# Patient Record
Sex: MALE | Race: WHITE | NOT HISPANIC OR LATINO | Employment: FULL TIME | URBAN - METROPOLITAN AREA
[De-identification: names, ages, dates, MRNs, and addresses within clinical notes are randomized per-mention and may not be internally consistent; named-entity substitution may affect disease eponyms.]

---

## 2018-07-18 ENCOUNTER — TELEPHONE (OUTPATIENT)
Dept: FAMILY MEDICINE | Facility: CLINIC | Age: 48
End: 2018-07-18

## 2018-07-18 NOTE — TELEPHONE ENCOUNTER
----- Message from Anna White sent at 7/18/2018  7:56 AM CDT -----    Patient   Dentist  Office , [facility]  [facility]  Called  To  Inform the doctor  Pt  Runny high    Blood  Pressure   High 147/108 142/103 pt  Had  Two  High  Readings //please call patient [phone]  For  Advice  As to  Coming  In   Or any  Other  Advice ,  Pt  Not on  Blood  Pressure  meds

## 2018-07-18 NOTE — TELEPHONE ENCOUNTER
Pt complains of hypertension:  How long have you been experiencing this? years  Do you keep a log?No  What was you last reading? 142/103  Are you taking your medication as directed? No  What time did you take your medication last? n/a  What are your symptoms?n/a, pt declines headache  Blurred vision?No  Ringing in ears?No  Feel flush?No  Dizziness?Yes  Increased heart rate?No  Shortness of breath?Non/a    Dyspneic on excertion?No  Chest pain?Non/a  Nausea/vomiting? No  Confusion? 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

## 2018-07-19 ENCOUNTER — OFFICE VISIT (OUTPATIENT)
Dept: FAMILY MEDICINE | Facility: CLINIC | Age: 48
End: 2018-07-19
Payer: COMMERCIAL

## 2018-07-19 VITALS
BODY MASS INDEX: 29.2 KG/M2 | HEART RATE: 74 BPM | SYSTOLIC BLOOD PRESSURE: 144 MMHG | WEIGHT: 227.5 LBS | TEMPERATURE: 98 F | HEIGHT: 74 IN | DIASTOLIC BLOOD PRESSURE: 98 MMHG

## 2018-07-19 DIAGNOSIS — R94.31 ABNORMAL EKG: ICD-10-CM

## 2018-07-19 DIAGNOSIS — Z78.9 CAFFEINE USE: ICD-10-CM

## 2018-07-19 DIAGNOSIS — I10 UNCONTROLLED HYPERTENSION: Primary | ICD-10-CM

## 2018-07-19 DIAGNOSIS — Z83.3 FAMILY HISTORY OF DIABETES MELLITUS (DM): ICD-10-CM

## 2018-07-19 DIAGNOSIS — E66.3 OVERWEIGHT: ICD-10-CM

## 2018-07-19 DIAGNOSIS — Z82.49 FAMILY HISTORY OF HEART DISEASE: ICD-10-CM

## 2018-07-19 PROCEDURE — 99214 OFFICE O/P EST MOD 30 MIN: CPT | Mod: S$GLB,,, | Performed by: INTERNAL MEDICINE

## 2018-07-19 PROCEDURE — 99999 PR PBB SHADOW E&M-EST. PATIENT-LVL III: CPT | Mod: PBBFAC,,, | Performed by: INTERNAL MEDICINE

## 2018-07-19 PROCEDURE — 93005 ELECTROCARDIOGRAM TRACING: CPT | Mod: S$GLB,,, | Performed by: INTERNAL MEDICINE

## 2018-07-19 PROCEDURE — 3008F BODY MASS INDEX DOCD: CPT | Mod: CPTII,S$GLB,, | Performed by: INTERNAL MEDICINE

## 2018-07-19 PROCEDURE — 93010 ELECTROCARDIOGRAM REPORT: CPT | Mod: ,,, | Performed by: INTERNAL MEDICINE

## 2018-07-19 RX ORDER — AMLODIPINE BESYLATE 5 MG/1
TABLET ORAL
Qty: 30 TABLET | Refills: 2 | Status: SHIPPED | OUTPATIENT
Start: 2018-07-19 | End: 2018-10-06 | Stop reason: SDUPTHER

## 2018-07-19 NOTE — PATIENT INSTRUCTIONS
Low-Salt Diet  This diet removes foods that are high in salt. It also limits the amount of salt you use when cooking. It is most often used for people with high blood pressure, edema (fluid retention), and kidney, liver, or heart disease.  Table salt contains the mineral sodium. Your body needs sodium to work normally. But too much sodium can make your health problems worse. Your healthcare provider is recommending a low-salt (also called low-sodium) diet for you. Your total daily allowance of salt is 1,500 to 2,300 milligrams (mg). It is less than 1 teaspoon of table salt. This means you can have only about 500 to 700 mg of sodium at each meal. People with certain health problems should limit salt intake to the lower end of the recommended range.    When you cook, dont add much salt. If you can cook without using salt, even better. Dont add salt to your food at the table.  When shopping, read food labels. Salt is often called sodium on the label. Choose foods that are salt-free, low salt, or very low salt. Note that foods with reduced salt may not lower your salt intake enough.    Beans, potatoes, and pasta  Ok: Dry beans, split peas, lentils, potatoes, rice, macaroni, pasta, spaghetti without added salt  Avoid: Potato chips, tortilla chips, and similar products  Breads and cereals  Ok: Low-sodium breads, rolls, cereals, and cakes; low-salt crackers, matzo crackers  Avoid: Salted crackers, pretzels, popcorn, Occitan toast, pancakes, muffins  Dairy  Ok: Milk, chocolate milk, hot chocolate mix, low-salt cheeses, and yogurt  Avoid: Processed cheese and cheese spreads; Roquefort, Camembert, and cottage cheese; buttermilk, instant breakfast drink  Desserts  Ok: Ice cream, frozen yogurt, juice bars, gelatin, cookies and pies, sugar, honey, jelly, hard candy  Avoid: Most pies, cakes and cookies prepared or processed with salt; instant pudding  Drinks  Ok: Tea, coffee, fizzy (carbonated) drinks, juices  Avoid: Flavored  coffees, electrolyte replacement drinks, sports drinks  Meats  Ok: All fresh meat, fish, poultry, low-salt tuna, eggs, egg substitute  Avoid: Smoked, pickled, brine-cured, or salted meats and fish. This includes brannon, chipped beef, corned beef, hot dogs, deli meats, ham, kosher meats, salt pork, sausage, canned tuna, salted codfish, smoked salmon, herring, sardines, or anchovies.  Seasonings and spices  Ok: Most seasonings are okay. Good substitutes for salt include: fresh herb blends, hot sauce, lemon, garlic, armendariz, vinegar, dry mustard, parsley, cilantro, horseradish, tomato paste, regular margarine, mayonnaise, unsalted butter, cream cheese, vegetable oil, cream, low-salt salad dressing and gravy.  Avoid: Regular ketchup, relishes, pickles, soy sauce, teriyaki sauce, Worcestershire sauce, BBQ sauce, tartar sauce, meat tenderizer, chili sauce, regular gravy, regular salad dressing, salted butter  Soups  Ok: Low-salt soups and broths made with allowed foods  Avoid: Bouillon cubes, soups with smoked or salted meats, regular soup and broth  Vegetables  Ok: Most vegetables are okay; also low-salt tomato and vegetable juices  Avoid: Sauerkraut and other brine-soaked vegetables; pickles and other pickled vegetables; tomato juice, olives  Date Last Reviewed: 8/1/2016 © 2000-2017 [employer]. [address] [place] [id number]. All rights reserved. This information is not intended as a substitute for professional medical care. Always follow your healthcare professional's instructions.        Eating Heart-Healthy Foods  Eating has a big impact on your heart health. In fact, eating healthier can improve several of your heart risks at once. For instance, it helps you manage weight, cholesterol, and blood pressure. Here are ideas to help you make heart-healthy changes without giving up all the foods and flavors you love.  Getting started  · Talk with your health care provider about eating plans, such  as the DASH or Mediterranean diet. You may also be referred to a dietitian.  · Change a few things at a time. Give yourself time to get used to a few eating changes before adding more.  · Work to create a tasty, healthy eating plan that you can stick to for the rest of your life.    Goals for healthy eating  Below are some tips to improve your eating habits:  · Limit saturated fats and trans fats. Saturated fats raise your levels of cholesterol, so keep these fats to a minimum. They are found in foods such as fatty meats, whole milk, cheese, and palm and coconut oils. Avoid trans fats because they lower good cholesterol as well as raise bad cholesterol. Trans fats are most often found in processed foods.  · Reduce sodium (salt) intake. Eating too much salt may increase your blood pressure. Limit your sodium intake to 2,300 milligrams (mg) per day, or less if your health care provider recommends it. Dining out less often and eating fewer processed foods are two great ways to decrease the amount of salt you consume.  · Managing calories. A calorie is a unit of energy. Your body burns calories for fuel, but if you eat more calories than your body burns, the extras are stored as fat. Your health care provider can help you create a diet plan to manage your calories. This will likely include eating healthier foods as well as exercising regularly. To help you track your progress, keep a diary to record what you eat and how often you exercise.  Choose the right foods  Aim to make these foods staples of your diet. If you have diabetes, you may have different recommendations than what is listed here:  · Fruits and vegetable provide plenty of nutrients without a lot of calories. At meals, fill half your plate with these foods. Split the other half of your plate between whole grains and lean protein.  · Whole grains are high in fiber and rich in vitamins and nutrients. Good choices include whole-wheat bread, pasta, and brown  rice.  · Lean proteins give you nutrition with less fat. Good choices include fish, skinless chicken, and beans.  · Low-fat or nonfat dairy provides nutrients without a lot of fat. Try low-fat or nonfat milk, cheese, or yogurt.  · Healthy fats can be good for you in small amounts. These are unsaturated fats, such as olive oil, nuts, and fish. Try to have at least 2 servings per week of fatty fish such as salmon, sardines, mackerel, rainbow trout, and albacore tuna. These contain omega-3 fatty acids, which are good for your heart. Flaxseed is another source of a heart-healthy fat.  More on heart healthy eating    Read food labels  Healthy eating starts at the grocery store. Be sure to pay attention to food labels on packaged foods. Look for products that are high in fiber and protein, and low in saturated fat, cholesterol, and sodium. Avoid products that contain trans fat. And pay close attention to serving size. For instance, if you plan to eat two servings, double all the numbers on the label.  Prepare food right  A key part of healthy cooking is cutting down on added fat and salt. Look on the internet for lower-fat, lower-sodium recipes. Also, try these tips:  · Remove fat from meat and skin from poultry before cooking.  · Skim fat from the surface of soups and sauces.  · Broil, boil, bake, steam, grill, and microwave food without added fats.  · Choose ingredients that spice up your food without adding calories, fat, or sodium. Try these items: horseradish, hot sauce, lemon, mustard, nonfat salad dressings, and vinegar. For salt-free herbs and spices, try basil, cilantro, cinnamon, pepper, and rosemary.  Date Last Reviewed: 6/25/2015  © 0788-9046 [employer]. [address] [place] [address], [place] [address]. All rights reserved. This information is not intended as a substitute for professional medical care. Always follow your healthcare professional's instructions.        Eating Heart-Healthy Food: Using  the DASH Plan    Eating for your heart doesnt have to be hard or boring. You just need to know how to make healthier choices. The DASH eating plan has been developed to help you do just that. DASH stands for Dietary Approaches to Stop Hypertension. It is a plan that has been proven to be healthier for your heart and to lower your risk for high blood pressure. It can also help lower your risk for cancer, heart disease, osteoporosis, and diabetes.  Choosing from each food group  Choose foods from each of the food groups below each day. Try to get the recommended number of servings for each food group. The serving numbers are based on a diet of 2,000 calories a day. Talk to your doctor if youre unsure about your calorie needs. Along with getting the correct servings, the DASH plan also recommends a sodium intake less than 2,300 mg per day.        Grains  Servings: 6 to 8 a day  A serving is:  · 1 slice bread  · 1 ounce dry cereal  · Half a cup cooked rice, pasta or cereal  Best choices: Whole grains and any grains high in fiber. Vegetables  Servings: 4 to 5 a day  A serving is:  · 1 cup raw leafy vegetable  · Half a cup cut-up raw or cooked vegetable  · Half a cup vegetable juice  Best choices: Fresh or frozen vegetables prepared without added salt or fat.   Fruits  Servings: 4 to 5 a day  A serving is:  · 1 medium fruit  · One-quarter cup dried fruit  · Half a cup fresh, frozen, or canned fruit  · Half a cup of 100% fruit juices  Best choices: A variety of fresh fruits of different colors. Whole fruits are a better choice than fruit juices. Low-fat or fat-free dairy  Servings: 2 to 3 a day  A serving is:  · 1 cup milk  · 1 cup yogurt  · One and a half ounces cheese  Best choices: Skim or 1% milk, low-fat or fat-free yogurt or buttermilk, and low-fat cheeses.         Lean meats, poultry, fish  Servings: 6 or fewer a day  A serving is:  · 1 ounce cooked meats, poultry, or fish  · 1 egg  Best choices: Lean poultry and  fish. Trim away visible fat. Broil, grill, roast, or boil instead of frying. Remove skin from poultry before eating. Limit how much red meat you eat.  Nuts, seeds, beans  Servings: 4 to 5 a week  A serving is:  · One-third cup nuts (one and a half ounces)  · 2 tablespoons nut butter or seeds  · Half a cup cooked dry beans or legumes  Best choices: Dry roasted nuts with no salt added, lentils, kidney beans, garbanzo beans, and whole garcia beans.   Fats and oils  Servings: 2 to 3 a day  A serving is:  · 1 teaspoon vegetable oil  · 1 teaspoon soft margarine  · 1 tablespoon mayonnaise  · 2 tablespoons salad dressing  Best choices: Nut and vegetable oils (nontropical vegetable oils), such as olive and canola oil. Sweets  Servings: 5 a week or fewer  A serving is:  · 1 tablespoon sugar, maple syrup, or honey  · 1 tablespoon jam or jelly  · 1 half-ounce jelly beans (about 15)  · 1 cup lemonade  Best choices: Dried fruit can be a satisfying sweet. Choose low-fat sweets. And watch your serving sizes!      For more on the DASH eating plan, visit:  www.nhlbi.nih.gov/health/health-topics/topics/dash   Date Last Reviewed: 6/1/2016  © 6813-1794 [employer]. [address] [place] [address], [place] [address]. All rights reserved. This information is not intended as a substitute for professional medical care. Always follow your healthcare professional's instructions.    Uncontrolled hypertension. Maintain < 2 Gm Na a day diet, and monitor BP at home; keep a log. DASH diet. Literature pulled up for him to read on diet.  Amlodipine 5 mg po daily. Call us in few days w his BP readings.    Caffeine use; change to 1/2 cut Community then after 2 weeks wean down further to decaff.    Overweight. Caloric restriction w regular exercise and weight reduction.    Family history of diabetes mellitus (DM)    Family history of heart disease; check EKG in office    Abnormal EKG in the office.  See Dr. Coy cardiology for further  evaluation to rule out coronary artery disease

## 2018-07-19 NOTE — PROGRESS NOTES
Subjective:       Patient ID: Steven Stephen is a 48 y.o. male.    Chief Complaint: desires evaluation for HTN as new dx.    HPI:   Seeing pt today for his PCP Dr Thompson. No prior dx of HTN; at dental office yesterday morning BP dinemap 142/103, manual 147/108 after waiting for 5 min. Called [email or web address] w message to Dr Thompson; schedule apt to see me for elevated BP. [employer] agent for work occupation. Some HA's interm last 3-4 yrs.; no BP readings during those episodes. Lasts briefly around 20 seconds. Back of occiput thru L eye. Watches salt intake except likes soup. Caffeine 2-3 cups a day of coffee. 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 HR 50's at night; 60-70's during day. Exercises regularly. Mildly overweight as BMI 29.21 includes added weight from work belt. [facility]; recommended ER if sx's or urgent care center otherwise schedule apt here to be seen. No overwhelming anxiety to speak of.    Seen [facility] ER last night for HA's and 191/104 BP; again 170/103. Not on any BP meds. No numbness, weakness, vision change ( some light sensitivity; has floaters as well occasionally) or tingling. Hx of iritis OD; sees Dr Gonzalo dial. As well for floaters. [facility] records not available; ordered. Given some medication IV in ER; bloodwork told wnl. HA subsided in ER; CT felt not needed. 161/96 upon leaving. 144/98 here today manually. No recent labs in computer w Epic.   Review of old records shows 3/31/16 FP office visit /96; 1/30/14 FP office visit /90. No chest pain, SOB, or palp.    EKG performed in the office abnormal today; please see below.  Nonspecific T-wave flattening laterally I/AVL noted. With nonspecific ST changes noted as well; precordially poor R-wave progression anteriorly noted with persistent S in V6; r/o ant/lat CAD/infarct.  Cardiology consult to Dr. Zbigniew dailey to rule out coronary artery disease. The patient without any chest pain, shortness  "of breath, or palpitations. ASA 81 mg a day added.  Total time 1005 to 11:00 a.m..  Greater than 50% of the time spent in discussion, counseling, and review.     Review of Systems   Constitutional: Negative for appetite change, fever and unexpected weight change.   HENT: Positive for sinus pressure. Negative for congestion, postnasal drip and rhinorrhea.          seasonal allergies,over last couple of days. Uses zyrtec, flonase prn.   Eyes: Negative for discharge and itching.   Respiratory: Negative for cough, chest tightness, shortness of breath and wheezing.    Cardiovascular: Negative for chest pain, palpitations and leg swelling.   Gastrointestinal: Negative for abdominal pain, blood in stool, constipation, diarrhea, nausea and vomiting.   Endocrine: Negative for polydipsia, polyphagia and polyuria.   Genitourinary: Negative for dysuria and hematuria.   Musculoskeletal: Negative for arthralgias and myalgias.   Skin: Negative for rash.   Allergic/Immunologic: Positive for environmental allergies and food allergies.        Shellfish allergy; abd pain w diarrhea.   Neurological: Negative for tremors, seizures and headaches.   Hematological: Negative for adenopathy. Does not bruise/bleed easily.   Psychiatric/Behavioral:        Denies anxiety or depression.       Objective:      Vitals:    07/19/18 0929   BP: (!) 144/98   Pulse: 74   Temp: 98.1 °F (36.7 °C)   Weight: 103.2 kg (227 lb 8 oz)   Height: 6' 2" (1.88 m)     Body mass index is 29.21 kg/m².    Physical Exam   Constitutional: He is oriented to person, place, and time. He appears well-developed and well-nourished.   HENT:   Head: Normocephalic and atraumatic.   Eyes: EOM are normal.   Neck: Normal range of motion. Neck supple. No thyromegaly present.   No carotid bruits heard   Cardiovascular: Normal rate, regular rhythm and normal heart sounds.  Exam reveals no gallop.    No murmur heard.  Pulmonary/Chest: Effort normal and breath sounds normal. No " respiratory distress. He has no wheezes. He has no rales.   Abdominal: Soft. Bowel sounds are normal. He exhibits no distension. There is no tenderness. There is no rebound and no guarding.   Musculoskeletal: Normal range of motion. He exhibits no edema.   Lymphadenopathy:     He has no cervical adenopathy.   Neurological: He is alert and oriented to person, place, and time.   Moves all 4 extremities fine.   Skin: No rash noted.   Psychiatric: He has a normal mood and affect. His behavior is normal. Thought content normal.   Vitals reviewed.      Assessment:       1. Uncontrolled hypertension    2. Caffeine use    3. Overweight    4. Abnormal EKG    5. Family history of diabetes mellitus (DM)    6. Family history of heart disease        Plan:       Uncontrolled hypertension. Maintain < 2 Gm Na a day diet, and monitor BP at home; keep a log. DASH diet. Literature pulled up for him to read on diet.  Amlodipine 5 mg po daily. Call us in few days w his BP readings.  Advised to get a biceps blood pressure cuff to monitor his blood pressure at home; also to sit for a few minutes before he takes the reading.    Caffeine use; change to 1/2 cut Community then after 2 weeks wean down further to decaff.  Limit caffeine consumption.    Overweight. Caloric restriction w regular exercise and weight reduction.    Abnormal EKG.  Nonspecific T-wave flattening laterally with nonspecific ST wave changes noted as well throughout EKG;  precordially poor R-wave progression with S wave noted in V6; rule out anterior disease as well as lateral disease. Cardiology consult to Dr. Coy; rule out coronary artery disease in patient with abnormal EKG; ASA 81 mg p.o. daily added    Family history of diabetes mellitus (DM)    Family history of heart disease; check EKG in office    Addendum:  Please try and obtain records from [facility] Emergency room; labs will be ordered after the labs performed in the ER are  reviewed.

## 2018-07-26 ENCOUNTER — PATIENT MESSAGE (OUTPATIENT)
Dept: FAMILY MEDICINE | Facility: CLINIC | Age: 48
End: 2018-07-26

## 2018-07-26 DIAGNOSIS — I10 ESSENTIAL HYPERTENSION: ICD-10-CM

## 2018-07-26 RX ORDER — HYDROCHLOROTHIAZIDE 12.5 MG/1
12.5 TABLET ORAL DAILY
Qty: 90 TABLET | Refills: 1 | Status: SHIPPED | OUTPATIENT
Start: 2018-07-26 | End: 2018-08-13

## 2018-08-13 ENCOUNTER — TELEPHONE (OUTPATIENT)
Dept: FAMILY MEDICINE | Facility: CLINIC | Age: 48
End: 2018-08-13

## 2018-08-13 ENCOUNTER — OFFICE VISIT (OUTPATIENT)
Dept: FAMILY MEDICINE | Facility: CLINIC | Age: 48
End: 2018-08-13
Payer: COMMERCIAL

## 2018-08-13 VITALS
SYSTOLIC BLOOD PRESSURE: 132 MMHG | HEIGHT: 74 IN | TEMPERATURE: 98 F | HEART RATE: 74 BPM | BODY MASS INDEX: 28.69 KG/M2 | WEIGHT: 223.56 LBS | DIASTOLIC BLOOD PRESSURE: 82 MMHG

## 2018-08-13 DIAGNOSIS — E66.3 OVERWEIGHT (BMI 25.0-29.9): ICD-10-CM

## 2018-08-13 DIAGNOSIS — R07.89 ATYPICAL CHEST PAIN: ICD-10-CM

## 2018-08-13 DIAGNOSIS — F41.9 ANXIETY: ICD-10-CM

## 2018-08-13 DIAGNOSIS — E78.00 HYPERCHOLESTEREMIA: ICD-10-CM

## 2018-08-13 DIAGNOSIS — R00.2 PALPITATIONS: ICD-10-CM

## 2018-08-13 DIAGNOSIS — E78.5 DYSLIPIDEMIA: ICD-10-CM

## 2018-08-13 DIAGNOSIS — I10 UNCONTROLLED HYPERTENSION: Primary | ICD-10-CM

## 2018-08-13 DIAGNOSIS — T88.7XXA SIDE EFFECT OF MEDICATION: ICD-10-CM

## 2018-08-13 PROCEDURE — 3008F BODY MASS INDEX DOCD: CPT | Mod: CPTII,S$GLB,, | Performed by: INTERNAL MEDICINE

## 2018-08-13 PROCEDURE — 99214 OFFICE O/P EST MOD 30 MIN: CPT | Mod: S$GLB,,, | Performed by: INTERNAL MEDICINE

## 2018-08-13 PROCEDURE — 99999 PR PBB SHADOW E&M-EST. PATIENT-LVL IV: CPT | Mod: PBBFAC,,, | Performed by: INTERNAL MEDICINE

## 2018-08-13 RX ORDER — RAMIPRIL 2.5 MG/1
2.5 CAPSULE ORAL DAILY
Qty: 30 CAPSULE | Refills: 2 | Status: SHIPPED | OUTPATIENT
Start: 2018-08-13 | End: 2018-09-10

## 2018-08-13 NOTE — PROGRESS NOTES
Subjective:       Patient ID: Steven Stephen is a 48 y.o. male.    Chief Complaint: Follow-up    HPI  Overall he's been doing fine.  HTN: Initially started on amlodipine 5 mg po daily for BP then DR Thompson added HCTZ 12.5 mg for better improvement but didn't agree w him; he developed higher BP and pulse rate from 50's  His baseline to 80's and BP actually increased; he stopped it and BP came down as well as pulse rate. Now 134-142/81-84 w pulse 68-73.  Has been having atypical chest discomfort last few seconds ; no predilatation to activit; no associated SOB, N/V or sweating; also has been having separate palpitations at times as well; lasting 4-5 seconds at a time. Also for couple of years on and off. No change w getting off caffeine. Has apt w Dr Hackett as cardiology but not til 1/3/19; will see if it can be moved up.   Work is stressful as [employer]. With not having work these above sx's still occur. Can run 4-12 miles; no chest discomfort or palpitations occur.  Total time: 1541-1429 hr; >50% time spent on discussion, counseling, and review.    Review of Systems   Constitutional: Negative for appetite change and unexpected weight change.   HENT: Negative for congestion, postnasal drip, rhinorrhea and sinus pressure.          seasonal allergies,    Eyes: Negative for discharge and itching.   Respiratory: Negative for cough, chest tightness, shortness of breath and wheezing.    Cardiovascular: Positive for chest pain and palpitations. Negative for leg swelling.        As atypical chest tightness or pressure. For few seconds at time. Not intense.   Gastrointestinal: Negative for abdominal pain, blood in stool, constipation, diarrhea, nausea and vomiting.   Endocrine: Negative for polydipsia, polyphagia and polyuria.   Genitourinary: Negative for dysuria and hematuria.   Musculoskeletal: Negative for arthralgias and myalgias.   Skin: Negative for rash.   Allergic/Immunologic: Positive for environmental allergies.  "Negative for food allergies.   Neurological: Negative for tremors, seizures and headaches.   Hematological: Negative for adenopathy. Does not bruise/bleed easily.   Psychiatric/Behavioral:        Some anxiety but no depression.       Objective:      Vitals:    08/13/18 0923   BP: 132/82   Pulse: 74   Temp: 98.1 °F (36.7 °C)   TempSrc: Oral   Weight: 101.4 kg (223 lb 8.7 oz)   Height: 6' 2" (1.88 m)     Body mass index is 28.7 kg/m².    Physical Exam   Constitutional: He is oriented to person, place, and time. He appears well-developed and well-nourished.   HENT:   Head: Normocephalic and atraumatic.   Eyes: EOM are normal.   Neck: Normal range of motion. Neck supple. No thyromegaly present.   Cardiovascular: Normal rate, regular rhythm and normal heart sounds. Exam reveals no gallop.   No murmur heard.  Pulmonary/Chest: Effort normal and breath sounds normal. No respiratory distress. He has no wheezes. He has no rales.   Abdominal: Soft. Bowel sounds are normal. He exhibits no distension. There is no tenderness. There is no rebound and no guarding.   Musculoskeletal: Normal range of motion. He exhibits no edema.   Lymphadenopathy:     He has no cervical adenopathy.   Neurological: He is alert and oriented to person, place, and time.   Moves all 4 extremities fine.   Skin: No rash noted.   Psychiatric: He has a normal mood and affect. His behavior is normal. Thought content normal.   Vitals reviewed.      Assessment:       1. Uncontrolled hypertension    2. Side effect of medication    3. Hypercholesteremia    4. Dyslipidemia    5. Overweight (BMI 25.0-29.9)    6. Atypical chest pain    7. Palpitations    8. Anxiety        Plan:       Uncontrolled hypertension. Maintain < 2 Gm Na a day diet, and monitor BP at home; keep a log.Add ramipril; stay off HCTZ. ASA 81 mg a day to continue.  -     ramipril (ALTACE) 2.5 MG capsule; Take 1 capsule (2.5 mg total) by mouth once daily.  Dispense: 30 capsule; Refill: 2  -     CBC " auto differential; Future; Expected date: 08/13/2018  -     Comprehensive metabolic panel; Future; Expected date: 08/13/2018  -     Lipid panel; Future; Expected date: 08/13/2018  -     Magnesium; Future; Expected date: 08/13/2018  -     TSH; Future; Expected date: 08/13/2018  -     T4, free; Future; Expected date: 08/13/2018  -     Urinalysis; Future; Expected date: 08/13/2018    Side effect of medication: HCTZ not tolerated well; stopped; had increased BP and pulse w atypical chest pain as well w palp.    Hypercholesteremia. Maintain low fat high fiber diet, exercise regularly.  -     Comprehensive metabolic panel; Future; Expected date: 08/13/2018  -     Lipid panel; Future; Expected date: 08/13/2018  -     TSH; Future; Expected date: 08/13/2018  -     T4, free; Future; Expected date: 08/13/2018    Dyslipidemia. Maintain low fat high fiber diet, exercise regularly.  -     Comprehensive metabolic panel; Future; Expected date: 08/13/2018  -     Lipid panel; Future; Expected date: 08/13/2018  -     TSH; Future; Expected date: 08/13/2018  -     T4, free; Future; Expected date: 08/13/2018    Overweight (BMI 25.0-29.9). Caloric restriction w regular exercise and weight reduction.  -     TSH; Future; Expected date: 08/13/2018  -     T4, free; Future; Expected date: 08/13/2018    Atypical chest pain; cardiology apt w Dr Hackett for 1/3/19 planned.  -     CBC auto differential; Future; Expected date: 08/13/2018  -     Comprehensive metabolic panel; Future; Expected date: 08/13/2018  -     Lipid panel; Future; Expected date: 08/13/2018  -     TSH; Future; Expected date: 08/13/2018  -     T4, free; Future; Expected date: 08/13/2018    Palpitations; interm and chronic. avr 1-2x a month to 2 mos in between.  Limit caffeine intake; no decongestants; rule out panic attacks  -     CBC auto differential; Future; Expected date: 08/13/2018  -     Comprehensive metabolic panel; Future; Expected date: 08/13/2018  -     TSH; Future;  Expected date: 08/13/2018  -     T4, free; Future; Expected date: 08/13/2018    Anxiety; will try buspar 1/3-1/2 po TID as needed for anxiety or chest discomfort/palpitations.  -     TSH; Future; Expected date: 08/13/2018  -     T4, free; Future; Expected date: 08/13/2018

## 2018-08-13 NOTE — PATIENT INSTRUCTIONS
Uncontrolled hypertension. Maintain < 2 Gm Na a day diet, and monitor BP at home; keep a log.Add ramipril; stay off HCTZ. ASA 81 mg a day to continue.  -     ramipril (ALTACE) 2.5 MG capsule; Take 1 capsule (2.5 mg total) by mouth once daily.  Dispense: 30 capsule; Refill: 2  -     CBC auto differential; Future; Expected date: 08/13/2018  -     Comprehensive metabolic panel; Future; Expected date: 08/13/2018  -     Lipid panel; Future; Expected date: 08/13/2018  -     Magnesium; Future; Expected date: 08/13/2018  -     TSH; Future; Expected date: 08/13/2018  -     T4, free; Future; Expected date: 08/13/2018  -     Urinalysis; Future; Expected date: 08/13/2018    Side effect of medication: HCTZ not tolerated well; stopped; had increased BP and pulse w atypical chest pain as well w palp.    Hypercholesteremia. Maintain low fat high fiber diet, exercise regularly.  -     Comprehensive metabolic panel; Future; Expected date: 08/13/2018  -     Lipid panel; Future; Expected date: 08/13/2018  -     TSH; Future; Expected date: 08/13/2018  -     T4, free; Future; Expected date: 08/13/2018    Dyslipidemia. Maintain low fat high fiber diet, exercise regularly.  -     Comprehensive metabolic panel; Future; Expected date: 08/13/2018  -     Lipid panel; Future; Expected date: 08/13/2018  -     TSH; Future; Expected date: 08/13/2018  -     T4, free; Future; Expected date: 08/13/2018    Overweight (BMI 25.0-29.9). Caloric restriction w regular exercise and weight reduction.  -     TSH; Future; Expected date: 08/13/2018  -     T4, free; Future; Expected date: 08/13/2018    Atypical chest pain; cardiology apt w Dr Hackett for 1/3/19 planned.  -     CBC auto differential; Future; Expected date: 08/13/2018  -     Comprehensive metabolic panel; Future; Expected date: 08/13/2018  -     Lipid panel; Future; Expected date: 08/13/2018  -     TSH; Future; Expected date: 08/13/2018  -     T4, free; Future; Expected date:  08/13/2018    Palpitations; interm and chronic. avr 1-2x a month to 2 mos in between.  -     CBC auto differential; Future; Expected date: 08/13/2018  -     Comprehensive metabolic panel; Future; Expected date: 08/13/2018  -     TSH; Future; Expected date: 08/13/2018  -     T4, free; Future; Expected date: 08/13/2018    Anxiety; will try buspar 1/3-1/2 po TID as needed for anxiety or chest discomfort/palpitations.  -     TSH; Future; Expected date: 08/13/2018  -     T4, free; Future; Expected date: 08/13/2018

## 2018-08-13 NOTE — TELEPHONE ENCOUNTER
----- Message from Mago Gonzalez sent at 8/13/2018  1:30 PM CDT -----  Type:  Patient Returning Call    Who Called:  Patient  Who Left Message for Patient:  Dr. Miramontes  Does the patient know what this is regarding?:  NA  Best Call Back Number:  [phone]  Additional Information:

## 2018-08-14 NOTE — TELEPHONE ENCOUNTER
I called patient to see his cardiac preference since Dr. Hackett's  earliest apt right now is 01/03/2019.  He prefers to have appointment with  providing he takes his network which is [employer] plan; would try touching bases with him tomorrow to see if he can be moved up some so can be before January 3, 2019

## 2018-09-04 ENCOUNTER — LAB VISIT (OUTPATIENT)
Dept: LAB | Facility: HOSPITAL | Age: 48
End: 2018-09-04
Attending: INTERNAL MEDICINE
Payer: COMMERCIAL

## 2018-09-04 DIAGNOSIS — I10 UNCONTROLLED HYPERTENSION: ICD-10-CM

## 2018-09-04 LAB
BILIRUB UR QL STRIP: NEGATIVE
CLARITY UR REFRACT.AUTO: CLEAR
COLOR UR AUTO: YELLOW
GLUCOSE UR QL STRIP: NEGATIVE
HGB UR QL STRIP: NEGATIVE
KETONES UR QL STRIP: NEGATIVE
LEUKOCYTE ESTERASE UR QL STRIP: NEGATIVE
NITRITE UR QL STRIP: NEGATIVE
PH UR STRIP: 5 [PH] (ref 5–8)
PROT UR QL STRIP: NEGATIVE
SP GR UR STRIP: 1.01 (ref 1–1.03)
URN SPEC COLLECT METH UR: NORMAL
UROBILINOGEN UR STRIP-ACNC: NEGATIVE EU/DL

## 2018-09-04 PROCEDURE — 81003 URINALYSIS AUTO W/O SCOPE: CPT

## 2018-09-10 ENCOUNTER — OFFICE VISIT (OUTPATIENT)
Dept: FAMILY MEDICINE | Facility: CLINIC | Age: 48
End: 2018-09-10
Payer: COMMERCIAL

## 2018-09-10 VITALS
TEMPERATURE: 99 F | HEIGHT: 74 IN | DIASTOLIC BLOOD PRESSURE: 80 MMHG | HEART RATE: 72 BPM | WEIGHT: 217.81 LBS | BODY MASS INDEX: 27.95 KG/M2 | SYSTOLIC BLOOD PRESSURE: 124 MMHG

## 2018-09-10 DIAGNOSIS — I10 ESSENTIAL HYPERTENSION: Primary | ICD-10-CM

## 2018-09-10 DIAGNOSIS — E66.3 OVERWEIGHT (BMI 25.0-29.9): ICD-10-CM

## 2018-09-10 DIAGNOSIS — F41.8 SITUATIONAL ANXIETY: ICD-10-CM

## 2018-09-10 DIAGNOSIS — E78.2 MIXED HYPERLIPIDEMIA: ICD-10-CM

## 2018-09-10 DIAGNOSIS — J30.2 SEASONAL ALLERGIC RHINITIS, UNSPECIFIED TRIGGER: ICD-10-CM

## 2018-09-10 PROCEDURE — 3008F BODY MASS INDEX DOCD: CPT | Mod: CPTII,S$GLB,, | Performed by: INTERNAL MEDICINE

## 2018-09-10 PROCEDURE — 99214 OFFICE O/P EST MOD 30 MIN: CPT | Mod: S$GLB,,, | Performed by: INTERNAL MEDICINE

## 2018-09-10 PROCEDURE — 99999 PR PBB SHADOW E&M-EST. PATIENT-LVL III: CPT | Mod: PBBFAC,,, | Performed by: INTERNAL MEDICINE

## 2018-09-10 RX ORDER — RAMIPRIL 5 MG/1
5 CAPSULE ORAL DAILY
Qty: 30 CAPSULE | Refills: 2 | Status: SHIPPED | OUTPATIENT
Start: 2018-09-10 | End: 2018-12-05 | Stop reason: SDUPTHER

## 2018-09-10 RX ORDER — BUSPIRONE HYDROCHLORIDE 15 MG/1
TABLET ORAL
Qty: 30 TABLET | Refills: 1 | Status: SHIPPED | OUTPATIENT
Start: 2018-09-10 | End: 2019-02-11 | Stop reason: SDUPTHER

## 2018-09-10 NOTE — PATIENT INSTRUCTIONS
Essential hypertension. Maintain < 2 Gm Na a day diet, and monitor BP at home; keep a log. Increase ramipril from 2.5 to 5 mg a day; Buspar as needed for anxiety or elevated BP.  -     Comprehensive metabolic panel; Future; Expected date: 09/10/2018  -     Lipid panel; Future; Expected date: 09/10/2018  -     Magnesium; Future; Expected date: 09/10/2018    Situational anxiety; cont to limit his caffeine.  -     busPIRone (BUSPAR) 15 MG tablet; 1/3-1/2 of 15 mg po TID as needed for anxiety.  Dispense: 30 tablet; Refill: 1    Mixed hyperlipidemia. Maintain low fat high fiber diet, exercise regularly. Will hold on a statin to see how he does w diet and exercise; can add citrucel daily.   -     Comprehensive metabolic panel; Future; Expected date: 09/10/2018  -     Lipid panel; Future; Expected date: 09/10/2018  -     Magnesium; Future; Expected date: 09/10/2018    Overweight (BMI 25.0-29.9); Caloric restriction w regular exercise and weight reduction. Has lost 10 lbs last 2 mos of note; cont to exercise as tolerated w smaller portions.    Seasonal allergic rhinitis, unspecified trigger; zyrtec 10 mg a day as needed; trial nasalcrom as needed; flonase led to nosebleed. Mucinex plain for congestion as well.

## 2018-09-10 NOTE — PROGRESS NOTES
Subjective:       Patient ID: Steven Stephen is a 48 y.o. male.    Chief Complaint: Follow-up    HPI  Overall doing better; has taken off 10 lbs in last 2 mos. Exercising; overweight w BMI 27.97; goal is to be exercisising 5x a week w min 30 minutes. 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 Ran 5 K race Saturday and did okay.  HTN: BP at home avr 127-137/77-88. BP here 124/80. Has stopped caffeinated tea and now on decaff coffee.  Feels better off HCTZ.  Will increase ramipril to 5 mg per day and add BuSpar as needed for anxiety; patient has changed coffee to decaf and is off tea for roughly 1 month now.  Mixed hyperlipidemia; updated cholesterol 206, triglyceride 189, HDL 38, .2; continue attempts at regular exercise and weight reduction; to add low fat high fiber diet regimen to <2 Gm Na a day.  Labs reviewed w pt at length; labs ordered for follow-up.  Will recheck lipids after few months to give him a chance to work on them w diet and exercise. Use buspar as needed for stress and elevated BP.  Total time 11:05 a.m. to 11:40 a.m..  Greater than 50% of time spent in discussion, counseling, and review.    Review of Systems   Constitutional: Negative for appetite change and unexpected weight change.   HENT: Negative for congestion, postnasal drip, rhinorrhea and sinus pressure.          seasonal allergies,    Eyes: Negative for discharge and itching.   Respiratory: Negative for cough, chest tightness, shortness of breath and wheezing.    Cardiovascular: Negative for chest pain, palpitations and leg swelling.   Gastrointestinal: Negative for abdominal pain, blood in stool, constipation, diarrhea, nausea and vomiting.   Endocrine: Negative for polydipsia, polyphagia and polyuria.   Genitourinary: Negative for dysuria and hematuria.   Musculoskeletal: Negative for arthralgias and myalgias.   Skin: Negative for rash.   Allergic/Immunologic: Positive for environmental allergies. Negative  "for food allergies.   Neurological: Negative for tremors, seizures and headaches.   Hematological: Negative for adenopathy. Does not bruise/bleed easily.   Psychiatric/Behavioral:        Mild anxiety but no depression.       Objective:      Vitals:    09/10/18 1008   BP: 124/80   Pulse: 72   Temp: 98.5 °F (36.9 °C)   Weight: 98.8 kg (217 lb 13 oz)   Height: 6' 2" (1.88 m)     Body mass index is 27.97 kg/m².    Physical Exam   Constitutional: He is oriented to person, place, and time. He appears well-developed and well-nourished.   HENT:   Head: Normocephalic and atraumatic.   Eyes: EOM are normal.   Neck: Normal range of motion. Neck supple. No thyromegaly present.   Cardiovascular: Normal rate, regular rhythm and normal heart sounds. Exam reveals no gallop.   No murmur heard.  Pulmonary/Chest: Effort normal and breath sounds normal. No respiratory distress. He has no wheezes. He has no rales.   Abdominal: Soft. Bowel sounds are normal. He exhibits no distension. There is no tenderness. There is no rebound and no guarding.   Musculoskeletal: Normal range of motion. He exhibits no edema.   Lymphadenopathy:     He has no cervical adenopathy.   Neurological: He is alert and oriented to person, place, and time.   Moves all 4 extremities fine.   Skin: No rash noted.   Psychiatric: He has a normal mood and affect. His behavior is normal. Thought content normal.   Vitals reviewed.      Assessment:       1. Essential hypertension    2. Situational anxiety    3. Mixed hyperlipidemia    4. Overweight (BMI 25.0-29.9)    5. Seasonal allergic rhinitis, unspecified trigger        Plan:       Essential hypertension. Maintain < 2 Gm Na a day diet, and monitor BP at home; keep a log. Increase ramipril from 2.5 to 5 mg a day; Buspar as needed for anxiety or elevated BP.  Sees Cardiology  01/03/2019  -     Comprehensive metabolic panel; Future; Expected date: 09/10/2018  -     Lipid panel; Future; Expected date: 09/10/2018  -  "    Magnesium; Future; Expected date: 09/10/2018    Situational anxiety; cont to limit his caffeine.  BuSpar ordered for p.r.n. anxiety.  To also be used as needed for elevated blood pressure  -     busPIRone (BUSPAR) 15 MG tablet; 1/3-1/2 of 15 mg po TID as needed for anxiety.  Dispense: 30 tablet; Refill: 1    Mixed hyperlipidemia. Maintain low fat high fiber diet, exercise regularly. Will hold on a statin to see how he does w diet and exercise; can add citrucel daily.   -     Comprehensive metabolic panel; Future; Expected date: 09/10/2018  -     Lipid panel; Future; Expected date: 09/10/2018  -     Magnesium; Future; Expected date: 09/10/2018    Overweight (BMI 25.0-29.9); Caloric restriction w regular exercise and weight reduction. Has lost 10 lbs last 2 mos of note; cont to exercise as tolerated w smaller portions.    Seasonal allergic rhinitis, unspecified trigger; zyrtec 10 mg a day as needed; trial nasalcrom as needed; flonase led to nosebleed. Mucinex plain for congestion as well.

## 2018-09-26 ENCOUNTER — PATIENT MESSAGE (OUTPATIENT)
Dept: FAMILY MEDICINE | Facility: CLINIC | Age: 48
End: 2018-09-26

## 2018-09-27 NOTE — TELEPHONE ENCOUNTER
Please notify patient that review of the blood pressure-she tendon shows fairly marked improvement in his blood pressure to now 110s to 120s over 70s; continue to monitor his blood pressure at home and watch his salt intake; keep his follow-up appointment for reassessment; continue present blood pressure medicine regiment.

## 2018-10-06 DIAGNOSIS — I10 UNCONTROLLED HYPERTENSION: ICD-10-CM

## 2018-10-09 RX ORDER — AMLODIPINE BESYLATE 5 MG/1
TABLET ORAL
Qty: 30 TABLET | Refills: 3 | Status: SHIPPED | OUTPATIENT
Start: 2018-10-09 | End: 2018-12-10 | Stop reason: SDUPTHER

## 2018-11-05 DIAGNOSIS — I10 UNCONTROLLED HYPERTENSION: ICD-10-CM

## 2018-11-05 RX ORDER — RAMIPRIL 2.5 MG/1
CAPSULE ORAL
Qty: 30 CAPSULE | Refills: 2 | OUTPATIENT
Start: 2018-11-05

## 2018-12-05 DIAGNOSIS — I10 ESSENTIAL HYPERTENSION: ICD-10-CM

## 2018-12-05 RX ORDER — RAMIPRIL 5 MG/1
5 CAPSULE ORAL DAILY
Qty: 30 CAPSULE | Refills: 2 | Status: SHIPPED | OUTPATIENT
Start: 2018-12-05 | End: 2018-12-10 | Stop reason: SDUPTHER

## 2018-12-10 ENCOUNTER — OFFICE VISIT (OUTPATIENT)
Dept: FAMILY MEDICINE | Facility: CLINIC | Age: 48
End: 2018-12-10
Payer: COMMERCIAL

## 2018-12-10 VITALS
SYSTOLIC BLOOD PRESSURE: 118 MMHG | OXYGEN SATURATION: 98 % | HEIGHT: 74 IN | TEMPERATURE: 99 F | BODY MASS INDEX: 28.18 KG/M2 | DIASTOLIC BLOOD PRESSURE: 70 MMHG | WEIGHT: 219.56 LBS | HEART RATE: 78 BPM

## 2018-12-10 DIAGNOSIS — E66.3 OVERWEIGHT (BMI 25.0-29.9): ICD-10-CM

## 2018-12-10 DIAGNOSIS — F41.9 ANXIETY: ICD-10-CM

## 2018-12-10 DIAGNOSIS — E78.2 MIXED HYPERLIPIDEMIA: ICD-10-CM

## 2018-12-10 DIAGNOSIS — Z82.49 FAMILY HISTORY OF HEART DISEASE: ICD-10-CM

## 2018-12-10 DIAGNOSIS — I10 ESSENTIAL HYPERTENSION: Primary | ICD-10-CM

## 2018-12-10 PROCEDURE — 99214 OFFICE O/P EST MOD 30 MIN: CPT | Mod: S$GLB,,, | Performed by: INTERNAL MEDICINE

## 2018-12-10 PROCEDURE — 3008F BODY MASS INDEX DOCD: CPT | Mod: CPTII,S$GLB,, | Performed by: INTERNAL MEDICINE

## 2018-12-10 PROCEDURE — 99999 PR PBB SHADOW E&M-EST. PATIENT-LVL III: CPT | Mod: PBBFAC,,, | Performed by: INTERNAL MEDICINE

## 2018-12-10 RX ORDER — AMLODIPINE BESYLATE 5 MG/1
TABLET ORAL
Qty: 90 TABLET | Refills: 1 | Status: SHIPPED | OUTPATIENT
Start: 2018-12-10 | End: 2019-09-05 | Stop reason: SDUPTHER

## 2018-12-10 RX ORDER — RAMIPRIL 5 MG/1
5 CAPSULE ORAL DAILY
Qty: 90 CAPSULE | Refills: 1 | Status: SHIPPED | OUTPATIENT
Start: 2018-12-10 | End: 2019-01-03 | Stop reason: DRUGHIGH

## 2018-12-10 RX ORDER — ATORVASTATIN CALCIUM 10 MG/1
10 TABLET, FILM COATED ORAL DAILY
Qty: 30 TABLET | Refills: 3 | Status: SHIPPED | OUTPATIENT
Start: 2018-12-10 | End: 2019-01-03 | Stop reason: SDUPTHER

## 2018-12-10 NOTE — PATIENT INSTRUCTIONS
Essential hypertension. Maintain < 2 Gm Na a day diet, and monitor BP at home; keep a log.  -     ramipril (ALTACE) 5 MG capsule; Take 1 capsule (5 mg total) by mouth once daily.  Dispense: 90 capsule; Refill: 1  -     amLODIPine (NORVASC) 5 MG tablet; TAKE 1 TABLET BY MOUTH EVERY DAY IN THE MORNING  Dispense: 90 tablet; Refill: 1  -     Lipid panel; Future; Expected date: 12/10/2018  -     Comprehensive metabolic panel; Future; Expected date: 12/10/2018    Mixed hyperlipidemia. Maintain low fat high fiber diet, exercise regularly. Atorvastatin 10 mg a day #30 w 3 refills.  -     Lipid panel; Future; Expected date: 12/10/2018  -     Comprehensive metabolic panel; Future; Expected date: 12/10/2018    Anxiety; doing better; mostly situational w work; buspar helps; limit caffeine.    Overweight (BMI 25.0-29.9). Caloric restriction w regular exercise and weight reduction.    Family history of heart disease  -     Lipid panel; Future; Expected date: 12/10/2018  -     Comprehensive metabolic panel; Future; Expected date: 12/10/2018

## 2018-12-10 NOTE — PROGRESS NOTES
Subjective:       Patient ID: Steven Stephen is a 48 y.o. male.    Chief Complaint: Hypertension (3 month f/up)    HPI  Overall doing well. HTN: Bp at home avr 125/75-77; watches his salt intake. BP here 118/70 manually. Tolerates norvasc fine. Job has changed so less stressful. Buspar helps we his stress of note. Palpitations have cleared. Has apt w Dr Hackett his cardiologist coming up in early January. Running helps him as well; weight starting to come down. Exercises regularly. Overweight BMI 28.19. Ramipril and amlodipine refilled.   Mixed HLP; on low fat diet w high fiber; could do better; Dad w heart ds; PTCA w stent for Dad at 60 for him. ASCVD 10 yr risk for p[t is calculated at 4.1% w lifetime 45.5% risk. Will try lipitor 10 mg a day for reduction; LDL in 160's. EST at work 4/29/13; did fine w no ischemic changes noted on EKG. .Has received influenza shot at work for this season.    Review of Systems   Constitutional: Negative for appetite change and unexpected weight change.   HENT: Negative for congestion, postnasal drip, rhinorrhea and sinus pressure.         Denies seasonal allergies, or perennial allergies   Eyes: Negative for discharge and itching.   Respiratory: Negative for cough, chest tightness, shortness of breath and wheezing.    Cardiovascular: Negative for chest pain, palpitations and leg swelling.   Gastrointestinal: Negative for abdominal pain, blood in stool, constipation, diarrhea, nausea and vomiting.   Endocrine: Negative for polydipsia, polyphagia and polyuria.   Genitourinary: Negative for dysuria and hematuria.   Musculoskeletal: Negative for arthralgias and myalgias.   Skin: Negative for rash.   Allergic/Immunologic: Negative for environmental allergies and food allergies.   Neurological: Negative for tremors, seizures and headaches.   Hematological: Negative for adenopathy. Does not bruise/bleed easily.   Psychiatric/Behavioral:        Denies anxiety or depression.       Objective:  "     Vitals:    12/10/18 0814   BP: 118/70   Pulse: 78   Temp: 98.8 °F (37.1 °C)   SpO2: 98%   Weight: 99.6 kg (219 lb 9.3 oz)   Height: 6' 2" (1.88 m)     Body mass index is 28.19 kg/m².    Physical Exam   Constitutional: He is oriented to person, place, and time. He appears well-developed and well-nourished.   HENT:   Head: Normocephalic and atraumatic.   Eyes: EOM are normal.   Neck: Normal range of motion. Neck supple. No thyromegaly present.   Cardiovascular: Normal rate, regular rhythm and normal heart sounds. Exam reveals no gallop.   No murmur heard.  Pulmonary/Chest: Effort normal and breath sounds normal. No respiratory distress. He has no wheezes. He has no rales.   Abdominal: Soft. Bowel sounds are normal. He exhibits no distension. There is no tenderness. There is no rebound and no guarding.   Musculoskeletal: Normal range of motion. He exhibits no edema.   Lymphadenopathy:     He has no cervical adenopathy.   Neurological: He is alert and oriented to person, place, and time.   Moves all 4 extremities fine.   Skin: No rash noted.   Psychiatric: He has a normal mood and affect. His behavior is normal. Thought content normal.   Vitals reviewed.      Assessment:       1. Essential hypertension    2. Mixed hyperlipidemia    3. Anxiety    4. Overweight (BMI 25.0-29.9)    5. Uncontrolled hypertension    6. Family history of heart disease        Plan:       Essential hypertension. Maintain < 2 Gm Na a day diet, and monitor BP at home; keep a log.  -     ramipril (ALTACE) 5 MG capsule; Take 1 capsule (5 mg total) by mouth once daily.  Dispense: 90 capsule; Refill: 1  -     amLODIPine (NORVASC) 5 MG tablet; TAKE 1 TABLET BY MOUTH EVERY DAY IN THE MORNING  Dispense: 90 tablet; Refill: 1  -     Lipid panel; Future; Expected date: 12/10/2018  -     Comprehensive metabolic panel; Future; Expected date: 12/10/2018    Mixed hyperlipidemia. Maintain low fat high fiber diet, exercise regularly. Atorvastatin 10 mg a day " #30 w 3 refills. CMP and lipid panel as f/u labs.  -     Lipid panel; Future; Expected date: 12/10/2018  -     Comprehensive metabolic panel; Future; Expected date: 12/10/2018    Anxiety; doing better; mostly situational w work; buspar helps; limit caffeine.    Overweight (BMI 25.0-29.9). Caloric restriction w regular exercise and weight reduction.    Family history of heart disease  -     Lipid panel; Future; Expected date: 12/10/2018  -     Comprehensive metabolic panel; Future; Expected date: 12/10/2018

## 2019-01-03 PROBLEM — E78.5 DYSLIPIDEMIA (HIGH LDL; LOW HDL): Status: ACTIVE | Noted: 2019-01-03

## 2019-01-03 PROBLEM — Z82.49 FAMILY HISTORY OF CORONARY ARTERY DISEASE: Status: ACTIVE | Noted: 2019-01-03

## 2019-01-15 ENCOUNTER — PATIENT MESSAGE (OUTPATIENT)
Dept: ADMINISTRATIVE | Facility: OTHER | Age: 49
End: 2019-01-15

## 2019-01-25 DIAGNOSIS — Z12.11 COLON CANCER SCREENING: ICD-10-CM

## 2019-02-11 DIAGNOSIS — F41.8 SITUATIONAL ANXIETY: ICD-10-CM

## 2019-02-12 RX ORDER — BUSPIRONE HYDROCHLORIDE 15 MG/1
TABLET ORAL
Qty: 30 TABLET | Refills: 2 | Status: SHIPPED | OUTPATIENT
Start: 2019-02-12 | End: 2019-12-11 | Stop reason: SDUPTHER

## 2019-02-22 ENCOUNTER — LAB VISIT (OUTPATIENT)
Dept: LAB | Facility: HOSPITAL | Age: 49
End: 2019-02-22
Attending: INTERNAL MEDICINE
Payer: COMMERCIAL

## 2019-02-22 DIAGNOSIS — I20.89 ANGINAL EQUIVALENT: ICD-10-CM

## 2019-02-22 DIAGNOSIS — E78.2 MIXED HYPERLIPIDEMIA: ICD-10-CM

## 2019-02-22 DIAGNOSIS — I10 ESSENTIAL HYPERTENSION: ICD-10-CM

## 2019-02-22 DIAGNOSIS — Z82.49 FAMILY HISTORY OF HEART DISEASE: ICD-10-CM

## 2019-02-22 LAB
ALBUMIN SERPL BCP-MCNC: 3.7 G/DL
ALP SERPL-CCNC: 78 U/L
ALT SERPL W/O P-5'-P-CCNC: 86 U/L
ANION GAP SERPL CALC-SCNC: 6 MMOL/L
AST SERPL-CCNC: 191 U/L
BILIRUB SERPL-MCNC: 0.5 MG/DL
BUN SERPL-MCNC: 13 MG/DL
CALCIUM SERPL-MCNC: 9.5 MG/DL
CHLORIDE SERPL-SCNC: 104 MMOL/L
CHOLEST SERPL-MCNC: 123 MG/DL
CHOLEST/HDLC SERPL: 3.4 {RATIO}
CK SERPL-CCNC: 6490 U/L
CO2 SERPL-SCNC: 29 MMOL/L
CREAT SERPL-MCNC: 1 MG/DL
EST. GFR  (AFRICAN AMERICAN): >60 ML/MIN/1.73 M^2
EST. GFR  (NON AFRICAN AMERICAN): >60 ML/MIN/1.73 M^2
GLUCOSE SERPL-MCNC: 81 MG/DL
HDLC SERPL-MCNC: 36 MG/DL
HDLC SERPL: 29.3 %
LDLC SERPL CALC-MCNC: 63 MG/DL
NONHDLC SERPL-MCNC: 87 MG/DL
POTASSIUM SERPL-SCNC: 4.3 MMOL/L
PROT SERPL-MCNC: 6.9 G/DL
SODIUM SERPL-SCNC: 139 MMOL/L
TRIGL SERPL-MCNC: 120 MG/DL

## 2019-02-22 PROCEDURE — 80061 LIPID PANEL: CPT

## 2019-02-22 PROCEDURE — 36415 COLL VENOUS BLD VENIPUNCTURE: CPT | Mod: PN

## 2019-02-22 PROCEDURE — 82550 ASSAY OF CK (CPK): CPT

## 2019-02-22 PROCEDURE — 80053 COMPREHEN METABOLIC PANEL: CPT

## 2019-02-23 ENCOUNTER — TELEPHONE (OUTPATIENT)
Dept: FAMILY MEDICINE | Facility: CLINIC | Age: 49
End: 2019-02-23

## 2019-02-27 ENCOUNTER — LAB VISIT (OUTPATIENT)
Dept: LAB | Facility: HOSPITAL | Age: 49
End: 2019-02-27
Attending: DERMATOLOGY
Payer: COMMERCIAL

## 2019-02-27 DIAGNOSIS — E78.2 MIXED HYPERLIPIDEMIA: ICD-10-CM

## 2019-02-27 DIAGNOSIS — I10 ESSENTIAL HYPERTENSION: ICD-10-CM

## 2019-02-27 DIAGNOSIS — I20.89 ANGINAL EQUIVALENT: ICD-10-CM

## 2019-02-27 DIAGNOSIS — R74.8 ELEVATED CPK: ICD-10-CM

## 2019-02-27 LAB
ALBUMIN SERPL BCP-MCNC: 3.7 G/DL
ALP SERPL-CCNC: 72 U/L
ALT SERPL W/O P-5'-P-CCNC: 108 U/L
ANION GAP SERPL CALC-SCNC: 8 MMOL/L
AST SERPL-CCNC: 139 U/L
BILIRUB SERPL-MCNC: 0.6 MG/DL
BUN SERPL-MCNC: 20 MG/DL
CALCIUM SERPL-MCNC: 9.4 MG/DL
CHLORIDE SERPL-SCNC: 106 MMOL/L
CHOLEST SERPL-MCNC: 122 MG/DL
CHOLEST/HDLC SERPL: 2.7 {RATIO}
CK SERPL-CCNC: 2490 U/L
CO2 SERPL-SCNC: 25 MMOL/L
CREAT SERPL-MCNC: 1 MG/DL
EST. GFR  (AFRICAN AMERICAN): >60 ML/MIN/1.73 M^2
EST. GFR  (NON AFRICAN AMERICAN): >60 ML/MIN/1.73 M^2
GLUCOSE SERPL-MCNC: 86 MG/DL
HDLC SERPL-MCNC: 45 MG/DL
HDLC SERPL: 36.9 %
LDLC SERPL CALC-MCNC: 62.8 MG/DL
NONHDLC SERPL-MCNC: 77 MG/DL
POTASSIUM SERPL-SCNC: 4.7 MMOL/L
PROT SERPL-MCNC: 6.7 G/DL
SODIUM SERPL-SCNC: 139 MMOL/L
TRIGL SERPL-MCNC: 71 MG/DL

## 2019-02-27 PROCEDURE — 80061 LIPID PANEL: CPT

## 2019-02-27 PROCEDURE — 80053 COMPREHEN METABOLIC PANEL: CPT

## 2019-02-27 PROCEDURE — 36415 COLL VENOUS BLD VENIPUNCTURE: CPT | Mod: PN

## 2019-02-27 PROCEDURE — 82550 ASSAY OF CK (CPK): CPT

## 2019-03-01 ENCOUNTER — OFFICE VISIT (OUTPATIENT)
Dept: FAMILY MEDICINE | Facility: CLINIC | Age: 49
End: 2019-03-01
Payer: COMMERCIAL

## 2019-03-01 VITALS
HEIGHT: 74 IN | DIASTOLIC BLOOD PRESSURE: 84 MMHG | HEART RATE: 76 BPM | TEMPERATURE: 98 F | WEIGHT: 230.25 LBS | SYSTOLIC BLOOD PRESSURE: 126 MMHG | OXYGEN SATURATION: 98 % | BODY MASS INDEX: 29.55 KG/M2

## 2019-03-01 DIAGNOSIS — E78.5 DYSLIPIDEMIA (HIGH LDL; LOW HDL): ICD-10-CM

## 2019-03-01 DIAGNOSIS — R74.01 TRANSAMINITIS: ICD-10-CM

## 2019-03-01 DIAGNOSIS — Z95.5 HISTORY OF CORONARY ARTERY STENT PLACEMENT: ICD-10-CM

## 2019-03-01 DIAGNOSIS — M79.10 MYALGIA: ICD-10-CM

## 2019-03-01 DIAGNOSIS — I10 ESSENTIAL HYPERTENSION: Primary | ICD-10-CM

## 2019-03-01 DIAGNOSIS — R74.8 ELEVATED CPK: ICD-10-CM

## 2019-03-01 DIAGNOSIS — Z82.49 FAMILY HISTORY OF CORONARY ARTERY DISEASE: ICD-10-CM

## 2019-03-01 DIAGNOSIS — E66.3 OVERWEIGHT (BMI 25.0-29.9): ICD-10-CM

## 2019-03-01 DIAGNOSIS — E78.2 MIXED HYPERLIPIDEMIA: ICD-10-CM

## 2019-03-01 DIAGNOSIS — S70.11XA TRAUMATIC ECCHYMOSIS OF RIGHT THIGH, INITIAL ENCOUNTER: ICD-10-CM

## 2019-03-01 PROCEDURE — 3079F PR MOST RECENT DIASTOLIC BLOOD PRESSURE 80-89 MM HG: ICD-10-PCS | Mod: CPTII,S$GLB,, | Performed by: INTERNAL MEDICINE

## 2019-03-01 PROCEDURE — 3074F PR MOST RECENT SYSTOLIC BLOOD PRESSURE < 130 MM HG: ICD-10-PCS | Mod: CPTII,S$GLB,, | Performed by: INTERNAL MEDICINE

## 2019-03-01 PROCEDURE — 3008F PR BODY MASS INDEX (BMI) DOCUMENTED: ICD-10-PCS | Mod: CPTII,S$GLB,, | Performed by: INTERNAL MEDICINE

## 2019-03-01 PROCEDURE — 3008F BODY MASS INDEX DOCD: CPT | Mod: CPTII,S$GLB,, | Performed by: INTERNAL MEDICINE

## 2019-03-01 PROCEDURE — 99999 PR PBB SHADOW E&M-EST. PATIENT-LVL III: ICD-10-PCS | Mod: PBBFAC,,, | Performed by: INTERNAL MEDICINE

## 2019-03-01 PROCEDURE — 99999 PR PBB SHADOW E&M-EST. PATIENT-LVL III: CPT | Mod: PBBFAC,,, | Performed by: INTERNAL MEDICINE

## 2019-03-01 PROCEDURE — 99214 OFFICE O/P EST MOD 30 MIN: CPT | Mod: S$GLB,,, | Performed by: INTERNAL MEDICINE

## 2019-03-01 PROCEDURE — 3074F SYST BP LT 130 MM HG: CPT | Mod: CPTII,S$GLB,, | Performed by: INTERNAL MEDICINE

## 2019-03-01 PROCEDURE — 99214 PR OFFICE/OUTPT VISIT, EST, LEVL IV, 30-39 MIN: ICD-10-PCS | Mod: S$GLB,,, | Performed by: INTERNAL MEDICINE

## 2019-03-01 PROCEDURE — 3079F DIAST BP 80-89 MM HG: CPT | Mod: CPTII,S$GLB,, | Performed by: INTERNAL MEDICINE

## 2019-03-01 RX ORDER — ATORVASTATIN CALCIUM 20 MG/1
20 TABLET, FILM COATED ORAL DAILY
COMMUNITY
End: 2019-03-01

## 2019-03-01 RX ORDER — TELMISARTAN 40 MG/1
40 TABLET ORAL DAILY
Qty: 90 TABLET | Refills: 1 | Status: SHIPPED | OUTPATIENT
Start: 2019-03-01 | End: 2019-05-26 | Stop reason: SDUPTHER

## 2019-03-01 NOTE — PATIENT INSTRUCTIONS
Essential hypertension; Maintain < 2 Gm Na a day diet, and monitor BP at home; keep a log. Stop ramipril due to association ACEI w lung cancer. Add micardis.  -     telmisartan (MICARDIS) 40 MG Tab; Take 1 tablet (40 mg total) by mouth once daily.  Dispense: 90 tablet; Refill: 1    Mixed hyperlipidemia. Maintain low fat high fiber diet, exercise regularly. Remain off atorvastatin due to elevated CPK and LFT's.  Can use Citrucel supplements twice daily to increase his fiber    Dyslipidemia (high LDL; low HDL)    Transaminitis. Remain off atorvastatin; Keep well hydrated. No alcohol; no NSAID's; avoid tylenol if possible. Milk thistle supplements.   -     Comprehensive metabolic panel; Future; Expected date: 03/01/2019  -     CK; Future; Expected date: 03/01/2019  -     Hepatitis A antibody, IgM; Future; Expected date: 03/01/2019  -     Hepatitis B surface antigen; Future; Expected date: 03/01/2019  -     Hepatitis B core antibody, IgM; Future; Expected date: 03/01/2019  -     Hepatitis C antibody; Future; Expected date: 03/01/2019  -     US Abdomen Complete; Future; Expected date: 03/01/2019    Elevated CPK; improving but still needs to remain off statin.   -     CK; Future; Expected date: 03/01/2019    Myalgia; have resolved. No exercise.     History of coronary artery stent placement; distal RCA by Dr Hackett; 1/17/19. [facility].    Traumatic ecchymosis of right thigh, initial encounter; thermal heat applications to thigh. Also likely elevating his CPK's.     Family history of coronary artery disease    Overweight (BMI 25.0-29.9); caloric restriction w weight reduction.

## 2019-03-01 NOTE — PROGRESS NOTES
Subjective:       Patient ID: Steven Stephen is a 49 y.o. male.    Chief Complaint: Follow-up    HPI  overall has been doing fine here for reassessment and go over his labs.  Hypertension:  Watches his salt intake; BP here manually 126/84.  Blood pressure at home averaging- 125/70 at home. Pt  on ramipril and metoprolol.  Due to recent association with Ace inhibitors with lung cancer will change ramipril to generic Micardis.    Mixed hyperlipidemia-dyslipidemia:  On low-fat high-fiber diet; pt tolerates atorvastatin fine; however now off.  As CPK markedly elevated at 6490, with follow-up 2490.  No myalgias except for right thigh discomfort from recent trauma with following through the ceiling.  Patient has been advised to keep well hydrated.  GFR is greater than 60.  He reportedly Monday through Thursday of last week was very sore with blood test he had done on Friday.  No fatigue just soreness.  Also contributing towards raising his CPK was likely not only his aggressive workups but his falling through the ceiling and having right thigh bruise 8 cm luis. with some discomfort.    Transaminitis:  AST has improved from 2 weeks ago 191 to now 139 but still greater than 3 times upper limits of normal.  ALT has worsened from 86 to 108 which is between 2-3 times upper limits normal; patient admits no alcohol intake and essentially no NSAID or Tylenol use..  Will continue to monitor his CPK and liver function tests; restart his statin as soon as possible; but would not use atorvastatin would recommend using Crestor/rosuvastatin.  Patient has also been working out aggressively since last Monday.  He is advised to stop workouts including his walking 4-5 miles per day for the time being and keep well hydrated.     Overweight:  BMI 29.56; needs to continue to keep active and exercise regularly as well as caloric restriction for weight reduction    Chest spasms/twitches intermittently radiating down right upper extremity with  pain; 01/17/2019 had a left heart catheterization by Dr. Hackett which led to stenting of the right coronary artery. Following results:  · Dist RCA lesion , 70% stenosed reduced to 0%. After 3.0 x 16 synergy stent, post dilated with 3.25 noncompliant balloon  · Moderate lesions in LAD with IVUS showing moderate plaque and preserved MLA. Continue medical management.  · The left ventricular systolic function is normal.  · LV end diastolic pressure is normal.  · Echocardiogram reportedly  with LV ejection fraction 67% and no significant abnormalities.    Review of Systems   Constitutional: Positive for activity change. Negative for appetite change, fatigue, fever and unexpected weight change.   HENT: Negative for congestion, postnasal drip, rhinorrhea and sinus pressure.         Denies seasonal allergies, or perennial allergies   Eyes: Negative for discharge and itching.   Respiratory: Negative for cough, chest tightness, shortness of breath and wheezing.    Cardiovascular: Negative for chest pain, palpitations and leg swelling.   Gastrointestinal: Negative for abdominal pain, blood in stool, constipation, diarrhea, nausea and vomiting.   Endocrine: Negative for polydipsia, polyphagia and polyuria.   Genitourinary: Negative for dysuria and hematuria.   Musculoskeletal: Positive for myalgias. Negative for arthralgias.        Have cleared at present.    Skin: Negative for rash.        Right distal medial thigh w bruise.    Allergic/Immunologic: Negative for environmental allergies and food allergies.   Neurological: Negative for tremors, seizures and headaches.   Hematological: Negative for adenopathy. Does not bruise/bleed easily.   Psychiatric/Behavioral:        Denies anxiety or depression.       Objective:      Vitals:    03/01/19 0748   BP: 126/84   BP Location: Right arm   Patient Position: Sitting   BP Method: Large (Manual)   Pulse: 76   Temp: 98.1 °F (36.7 °C)   TempSrc: Oral   SpO2: 98%   Weight: 104.5 kg (230 lb  "4.3 oz)   Height: 6' 2" (1.88 m)     Body mass index is 29.56 kg/m².    Physical Exam   Constitutional: He is oriented to person, place, and time. He appears well-developed and well-nourished.   HENT:   Head: Normocephalic and atraumatic.   Eyes: EOM are normal.   Neck: Normal range of motion. Neck supple. No thyromegaly present.   Cardiovascular: Normal rate, regular rhythm and normal heart sounds. Exam reveals no gallop.   No murmur heard.  Pulmonary/Chest: Effort normal and breath sounds normal. No respiratory distress. He has no wheezes. He has no rales.   Abdominal: Soft. Bowel sounds are normal. He exhibits no distension. There is no tenderness. There is no rebound and no guarding.   Musculoskeletal: Normal range of motion. He exhibits no edema.   Lymphadenopathy:     He has no cervical adenopathy.   Neurological: He is alert and oriented to person, place, and time.   Moves all 4 extremities fine.   Skin: No rash noted.   Right medial distal thigh w mildly tender 8 cm luis area ecchymosis. No hematoma palpated.    Psychiatric: He has a normal mood and affect. His behavior is normal. Thought content normal.   Vitals reviewed.      Assessment:       1. Essential hypertension    2. Mixed hyperlipidemia    3. Dyslipidemia (high LDL; low HDL)    4. Transaminitis    5. Elevated CPK    6. Myalgia    7. History of coronary artery stent placement    8. Traumatic ecchymosis of right thigh, initial encounter    9. Family history of coronary artery disease    10. Overweight (BMI 25.0-29.9)        Plan:       Essential hypertension; Maintain < 2 Gm Na a day diet, and monitor BP at home; keep a log. Stop ramipril due to association ACEI w lung cancer. Add micardis.  -     telmisartan (MICARDIS) 40 MG Tab; Take 1 tablet (40 mg total) by mouth once daily.  Dispense: 90 tablet; Refill: 1    Mixed hyperlipidemia. Maintain low fat high fiber diet, exercise regularly. Remain off atorvastatin due to elevated CPK and LFT's.  Once " transaminases at 2 times upper limits of normal or less will resume his statin but not atorvastatin will use Crestor/rosuvastatin.  Needs aggressive treatment of his hyperlipidemia especially in lieu of recent coronary stent placement; will monitor closely.    Dyslipidemia (high LDL; low HDL); as above for mixed hyperlipidemia.    Transaminitis. Remain off atorvastatin; Keep well hydrated. No alcohol; no NSAID's; avoid tylenol if possible. Milk thistle supplements.  Keep well hydrated; avoid any extensive workups at this period in time for the time being due to marked elevation in his CPK levels.  Will check ultrasound of the abdomen as well as hepatitis A, B, and C screen; continue to follow his liver function test and resume statin as rosuvastatin/Crestor as soon as possible; ideally once transaminases are less than 2 times the upper limits of normal and further improvement in the CPK is noted. Needs aggressive treatment of his hyperlipidemia in the face of recent coronary stent placement.; needs to resume statin therapy as soon as possible.  -     Comprehensive metabolic panel; Future; Expected date: 03/01/2019  -     CK; Future; Expected date: 03/01/2019  -     Hepatitis A antibody, IgM; Future; Expected date: 03/01/2019  -     Hepatitis B surface antigen; Future; Expected date: 03/01/2019  -     Hepatitis B core antibody, IgM; Future; Expected date: 03/01/2019  -     Hepatitis C antibody; Future; Expected date: 03/01/2019  -     US Abdomen Complete; Future; Expected date: 03/01/2019    Elevated CPK; improving but still needs to remain off statin.  Will follow up closely has advised not to do any intensive workup for the time being due to CPK elevation and keep well hydrated; will resume statin therapy as soon is felt feasible with further correction in his liver function test and CPK levels  -     CK; Future; Expected date: 03/01/2019    Myalgia; have resolved. No exercise for the time being.  CPK still  significantly elevated at 2490 but down from 6490.  GFR normal at greater than 60.  Keep well hydrated    History of coronary artery stent placement; distal RCA by Dr Hackett; 1/17/19. [facility].  Keep follow-up with cardiology services directed    Traumatic ecchymosis of right thigh, initial encounter; thermal heat applications to thigh. Also likely elevating his CPK's.  8 cm diameter area of ecchymosis right thigh noted    Family history of coronary artery disease    Overweight (BMI 25.0-29.9); caloric restriction w weight reduction.

## 2019-03-07 ENCOUNTER — HOSPITAL ENCOUNTER (OUTPATIENT)
Dept: RADIOLOGY | Facility: HOSPITAL | Age: 49
Discharge: HOME | End: 2019-03-07
Attending: INTERNAL MEDICINE
Payer: COMMERCIAL

## 2019-03-07 DIAGNOSIS — R74.01 TRANSAMINITIS: ICD-10-CM

## 2019-03-07 PROCEDURE — 76700 US EXAM ABDOM COMPLETE: CPT | Mod: TC,PO

## 2019-03-07 PROCEDURE — 76700 US ABDOMEN COMPLETE: ICD-10-PCS | Mod: 26,,, | Performed by: RADIOLOGY

## 2019-03-07 PROCEDURE — 76700 US EXAM ABDOM COMPLETE: CPT | Mod: 26,,, | Performed by: RADIOLOGY

## 2019-03-10 ENCOUNTER — TELEPHONE (OUTPATIENT)
Dept: FAMILY MEDICINE | Facility: CLINIC | Age: 49
End: 2019-03-10

## 2019-03-10 NOTE — TELEPHONE ENCOUNTER
Tried to reach patient by phone and got a recorder.  Left a message on voicemail.  Called to ensure that he started rosuvastatin at 10 mg per day and that it was safe for him to start exercising again; and keep his follow-up appointment where we can go over the rest of his workup and for reassessment.    Please call and check on the patient on Monday morning to ensure that he starts rosuvastatin 10 mg per day as ordered a few days earlier by Cardiology as his CPKs entirely back to normal with marked improvement in his liver enzymes

## 2019-03-20 ENCOUNTER — LAB VISIT (OUTPATIENT)
Dept: LAB | Facility: HOSPITAL | Age: 49
End: 2019-03-20
Attending: NURSE PRACTITIONER
Payer: COMMERCIAL

## 2019-03-20 DIAGNOSIS — I25.10 CORONARY ARTERY DISEASE, ANGINA PRESENCE UNSPECIFIED, UNSPECIFIED VESSEL OR LESION TYPE, UNSPECIFIED WHETHER NATIVE OR TRANSPLANTED HEART: ICD-10-CM

## 2019-03-20 LAB
ALBUMIN SERPL BCP-MCNC: 4.1 G/DL
ALP SERPL-CCNC: 80 U/L
ALT SERPL W/O P-5'-P-CCNC: 38 U/L
ANION GAP SERPL CALC-SCNC: 5 MMOL/L
AST SERPL-CCNC: 26 U/L
BILIRUB SERPL-MCNC: 0.9 MG/DL
BUN SERPL-MCNC: 17 MG/DL
CALCIUM SERPL-MCNC: 9.9 MG/DL
CHLORIDE SERPL-SCNC: 106 MMOL/L
CHOLEST SERPL-MCNC: 149 MG/DL
CHOLEST/HDLC SERPL: 3.2 {RATIO}
CK SERPL-CCNC: 93 U/L
CK SERPL-CCNC: 93 U/L
CO2 SERPL-SCNC: 29 MMOL/L
CREAT SERPL-MCNC: 1.1 MG/DL
EST. GFR  (AFRICAN AMERICAN): >60 ML/MIN/1.73 M^2
EST. GFR  (NON AFRICAN AMERICAN): >60 ML/MIN/1.73 M^2
GLUCOSE SERPL-MCNC: 96 MG/DL
HDLC SERPL-MCNC: 47 MG/DL
HDLC SERPL: 31.5 %
LDLC SERPL CALC-MCNC: 87 MG/DL
NONHDLC SERPL-MCNC: 102 MG/DL
POTASSIUM SERPL-SCNC: 4.6 MMOL/L
PROT SERPL-MCNC: 7.2 G/DL
SODIUM SERPL-SCNC: 140 MMOL/L
TRIGL SERPL-MCNC: 75 MG/DL

## 2019-03-20 PROCEDURE — 80053 COMPREHEN METABOLIC PANEL: CPT

## 2019-03-20 PROCEDURE — 82550 ASSAY OF CK (CPK): CPT

## 2019-03-20 PROCEDURE — 80061 LIPID PANEL: CPT

## 2019-03-20 PROCEDURE — 36415 COLL VENOUS BLD VENIPUNCTURE: CPT | Mod: PO

## 2019-03-29 ENCOUNTER — OFFICE VISIT (OUTPATIENT)
Dept: FAMILY MEDICINE | Facility: CLINIC | Age: 49
End: 2019-03-29
Payer: COMMERCIAL

## 2019-03-29 VITALS
HEIGHT: 74 IN | RESPIRATION RATE: 15 BRPM | TEMPERATURE: 98 F | OXYGEN SATURATION: 97 % | SYSTOLIC BLOOD PRESSURE: 114 MMHG | BODY MASS INDEX: 29.64 KG/M2 | DIASTOLIC BLOOD PRESSURE: 70 MMHG | HEART RATE: 82 BPM | WEIGHT: 230.94 LBS

## 2019-03-29 DIAGNOSIS — E78.5 DYSLIPIDEMIA (HIGH LDL; LOW HDL): ICD-10-CM

## 2019-03-29 DIAGNOSIS — E78.2 MIXED HYPERLIPIDEMIA: ICD-10-CM

## 2019-03-29 DIAGNOSIS — Z82.49 FAMILY HISTORY OF CORONARY ARTERY DISEASE: ICD-10-CM

## 2019-03-29 DIAGNOSIS — Z95.5 HISTORY OF CORONARY ARTERY STENT PLACEMENT: ICD-10-CM

## 2019-03-29 DIAGNOSIS — I10 ESSENTIAL HYPERTENSION: Primary | ICD-10-CM

## 2019-03-29 PROCEDURE — 3008F PR BODY MASS INDEX (BMI) DOCUMENTED: ICD-10-PCS | Mod: CPTII,S$GLB,, | Performed by: INTERNAL MEDICINE

## 2019-03-29 PROCEDURE — 99214 PR OFFICE/OUTPT VISIT, EST, LEVL IV, 30-39 MIN: ICD-10-PCS | Mod: S$GLB,,, | Performed by: INTERNAL MEDICINE

## 2019-03-29 PROCEDURE — 3078F PR MOST RECENT DIASTOLIC BLOOD PRESSURE < 80 MM HG: ICD-10-PCS | Mod: CPTII,S$GLB,, | Performed by: INTERNAL MEDICINE

## 2019-03-29 PROCEDURE — 3074F SYST BP LT 130 MM HG: CPT | Mod: CPTII,S$GLB,, | Performed by: INTERNAL MEDICINE

## 2019-03-29 PROCEDURE — 99214 OFFICE O/P EST MOD 30 MIN: CPT | Mod: S$GLB,,, | Performed by: INTERNAL MEDICINE

## 2019-03-29 PROCEDURE — 99999 PR PBB SHADOW E&M-EST. PATIENT-LVL III: ICD-10-PCS | Mod: PBBFAC,,, | Performed by: INTERNAL MEDICINE

## 2019-03-29 PROCEDURE — 99999 PR PBB SHADOW E&M-EST. PATIENT-LVL III: CPT | Mod: PBBFAC,,, | Performed by: INTERNAL MEDICINE

## 2019-03-29 PROCEDURE — 3008F BODY MASS INDEX DOCD: CPT | Mod: CPTII,S$GLB,, | Performed by: INTERNAL MEDICINE

## 2019-03-29 PROCEDURE — 3078F DIAST BP <80 MM HG: CPT | Mod: CPTII,S$GLB,, | Performed by: INTERNAL MEDICINE

## 2019-03-29 PROCEDURE — 3074F PR MOST RECENT SYSTOLIC BLOOD PRESSURE < 130 MM HG: ICD-10-PCS | Mod: CPTII,S$GLB,, | Performed by: INTERNAL MEDICINE

## 2019-03-29 RX ORDER — ROSUVASTATIN CALCIUM 20 MG/1
20 TABLET, COATED ORAL DAILY
Qty: 90 TABLET | Refills: 1 | Status: SHIPPED | OUTPATIENT
Start: 2019-03-29 | End: 2019-06-27

## 2019-03-29 NOTE — PROGRESS NOTES
"Subjective:       Patient ID: Steven Stephen is a 49 y.o. male.    Chief Complaint: Hypertension (2 week follow up and assessment of blood pressure )    HPI    Review of Systems   Constitutional: Negative for appetite change and unexpected weight change.   HENT: Negative for congestion, postnasal drip, rhinorrhea and sinus pressure.          seasonal allergies,   Eyes: Negative for discharge and itching.   Respiratory: Negative for cough, chest tightness, shortness of breath and wheezing.    Cardiovascular: Negative for chest pain, palpitations and leg swelling.   Gastrointestinal: Negative for abdominal pain, blood in stool, constipation, diarrhea, nausea and vomiting.   Endocrine: Negative for polydipsia, polyphagia and polyuria.   Genitourinary: Negative for dysuria and hematuria.   Musculoskeletal: Negative for arthralgias and myalgias.   Skin: Negative for rash.   Allergic/Immunologic: Negative for environmental allergies and food allergies.   Neurological: Negative for tremors, seizures and headaches.   Hematological: Negative for adenopathy. Does not bruise/bleed easily.   Psychiatric/Behavioral:        Denies anxiety or depression.       Objective:      Vitals:    03/29/19 1511   BP: 114/70   Pulse: 82   Resp: 15   Temp: 98.2 °F (36.8 °C)   TempSrc: Oral   SpO2: 97%   Weight: 104.8 kg (230 lb 14.9 oz)   Height: 6' 2" (1.88 m)     Body mass index is 29.65 kg/m².    Physical Exam   Constitutional: He is oriented to person, place, and time. He appears well-developed and well-nourished.   HENT:   Head: Normocephalic and atraumatic.   Eyes: EOM are normal.   Neck: Normal range of motion. Neck supple. No thyromegaly present.   No carotid bruits heard.    Cardiovascular: Normal rate, regular rhythm and normal heart sounds. Exam reveals no gallop.   No murmur heard.  Pulmonary/Chest: Effort normal and breath sounds normal. No respiratory distress. He has no wheezes. He has no rales.   Abdominal: Soft. Bowel sounds " are normal. He exhibits no distension. There is no tenderness. There is no rebound and no guarding.   Musculoskeletal: Normal range of motion. He exhibits no edema.   Lymphadenopathy:     He has no cervical adenopathy.   Neurological: He is alert and oriented to person, place, and time.   Moves all 4 extremities fine.   Skin: No rash noted.   Psychiatric: He has a normal mood and affect. His behavior is normal. Thought content normal.   Vitals reviewed.      Assessment:       1. Essential hypertension    2. Mixed hyperlipidemia    3. Dyslipidemia (high LDL; low HDL)    4. Family history of coronary artery disease    5. History of coronary artery stent placement        Plan:      Essential hypertension. Maintain < 2 Gm Na a day diet, and monitor BP at home; keep a log. Same treatment.   -     Comprehensive metabolic panel; Future; Expected date: 03/29/2019  -     Lipid panel; Future; Expected date: 03/29/2019  -     Magnesium; Future; Expected date: 03/29/2019    Mixed hyperlipidemia. Maintain low fat high fiber diet, exercise regularly. Increased crestor recently to 20 mg a day as per cardiology recommendation and agree of need for increase. Goal LDL low 50's. ^ blockages and 1 stent placed.   -     rosuvastatin (CRESTOR) 20 MG tablet; Take 1 tablet (20 mg total) by mouth once daily.  Dispense: 90 tablet; Refill: 1  -     Comprehensive metabolic panel; Future; Expected date: 03/29/2019  -     Lipid panel; Future; Expected date: 03/29/2019  -     Magnesium; Future; Expected date: 03/29/2019  -     CK; Future; Expected date: 03/29/2019    Dyslipidemia (high LDL; low HDL)  -     rosuvastatin (CRESTOR) 20 MG tablet; Take 1 tablet (20 mg total) by mouth once daily.  Dispense: 90 tablet; Refill: 1  -     Comprehensive metabolic panel; Future; Expected date: 03/29/2019  -     Lipid panel; Future; Expected date: 03/29/2019    Family history of coronary artery disease    History of coronary artery stent placement  -      rosuvastatin (CRESTOR) 20 MG tablet; Take 1 tablet (20 mg total) by mouth once daily.  Dispense: 90 tablet; Refill: 1  -     Comprehensive metabolic panel; Future; Expected date: 03/29/2019  -     Lipid panel; Future; Expected date: 03/29/2019

## 2019-03-29 NOTE — PATIENT INSTRUCTIONS
Essential hypertension. Maintain < 2 Gm Na a day diet, and monitor BP at home; keep a log. Same treatment.   -     Comprehensive metabolic panel; Future; Expected date: 03/29/2019  -     Lipid panel; Future; Expected date: 03/29/2019  -     Magnesium; Future; Expected date: 03/29/2019    Mixed hyperlipidemia. Maintain low fat high fiber diet, exercise regularly. Increased crestor recently to 20 mg a day as per cardiology recommendation and agree of need for increase. Goal LDL low 50's. ^ blockages and 1 stent placed.   -     rosuvastatin (CRESTOR) 20 MG tablet; Take 1 tablet (20 mg total) by mouth once daily.  Dispense: 90 tablet; Refill: 1  -     Comprehensive metabolic panel; Future; Expected date: 03/29/2019  -     Lipid panel; Future; Expected date: 03/29/2019  -     Magnesium; Future; Expected date: 03/29/2019  -     CK; Future; Expected date: 03/29/2019    Dyslipidemia (high LDL; low HDL)  -     rosuvastatin (CRESTOR) 20 MG tablet; Take 1 tablet (20 mg total) by mouth once daily.  Dispense: 90 tablet; Refill: 1  -     Comprehensive metabolic panel; Future; Expected date: 03/29/2019  -     Lipid panel; Future; Expected date: 03/29/2019    Family history of coronary artery disease    History of coronary artery stent placement  -     rosuvastatin (CRESTOR) 20 MG tablet; Take 1 tablet (20 mg total) by mouth once daily.  Dispense: 90 tablet; Refill: 1  -     Comprehensive metabolic panel; Future; Expected date: 03/29/2019  -     Lipid panel; Future; Expected date: 03/29/2019

## 2019-05-26 DIAGNOSIS — I10 ESSENTIAL HYPERTENSION: ICD-10-CM

## 2019-05-28 RX ORDER — TELMISARTAN 40 MG/1
TABLET ORAL
Qty: 90 TABLET | Refills: 1 | Status: SHIPPED | OUTPATIENT
Start: 2019-05-28 | End: 2019-12-11 | Stop reason: SDUPTHER

## 2019-05-29 ENCOUNTER — TELEPHONE (OUTPATIENT)
Dept: FAMILY MEDICINE | Facility: CLINIC | Age: 49
End: 2019-05-29

## 2019-05-29 NOTE — TELEPHONE ENCOUNTER
----- Message from Ewa Markham sent at 5/29/2019  2:43 PM CDT -----  Contact: Pt  Type: Needs Medical Advice    Who Called:  Patient  Best Call Back Number: [phone] (home) [phone] (work)  Additional Information: patient said he needs a new appt would like a call back to come in another day other than what we offered pt said he can come later in the evenings as well.

## 2019-05-29 NOTE — TELEPHONE ENCOUNTER
Tried to reach pt. No answer, will try again later and I left a message on answering machine.    Contacting to inform pt of schedule availabilities.

## 2019-05-31 ENCOUNTER — LAB VISIT (OUTPATIENT)
Dept: LAB | Facility: HOSPITAL | Age: 49
End: 2019-05-31
Attending: INTERNAL MEDICINE
Payer: COMMERCIAL

## 2019-05-31 DIAGNOSIS — E78.5 DYSLIPIDEMIA (HIGH LDL; LOW HDL): ICD-10-CM

## 2019-05-31 DIAGNOSIS — I10 ESSENTIAL HYPERTENSION: ICD-10-CM

## 2019-05-31 DIAGNOSIS — Z95.5 HISTORY OF CORONARY ARTERY STENT PLACEMENT: ICD-10-CM

## 2019-05-31 DIAGNOSIS — E78.2 MIXED HYPERLIPIDEMIA: ICD-10-CM

## 2019-05-31 LAB
ALBUMIN SERPL BCP-MCNC: 3.9 G/DL (ref 3.5–5.2)
ALP SERPL-CCNC: 63 U/L (ref 55–135)
ALT SERPL W/O P-5'-P-CCNC: 36 U/L (ref 10–44)
ANION GAP SERPL CALC-SCNC: 9 MMOL/L (ref 8–16)
AST SERPL-CCNC: 29 U/L (ref 10–40)
BILIRUB SERPL-MCNC: 0.7 MG/DL (ref 0.1–1)
BUN SERPL-MCNC: 16 MG/DL (ref 6–20)
CALCIUM SERPL-MCNC: 9.7 MG/DL (ref 8.7–10.5)
CHLORIDE SERPL-SCNC: 105 MMOL/L (ref 95–110)
CHOLEST SERPL-MCNC: 151 MG/DL (ref 120–199)
CHOLEST/HDLC SERPL: 3.3 {RATIO} (ref 2–5)
CK SERPL-CCNC: 164 U/L (ref 20–200)
CO2 SERPL-SCNC: 24 MMOL/L (ref 23–29)
CREAT SERPL-MCNC: 1.2 MG/DL (ref 0.5–1.4)
EST. GFR  (AFRICAN AMERICAN): >60 ML/MIN/1.73 M^2
EST. GFR  (NON AFRICAN AMERICAN): >60 ML/MIN/1.73 M^2
GLUCOSE SERPL-MCNC: 92 MG/DL (ref 70–110)
HDLC SERPL-MCNC: 46 MG/DL (ref 40–75)
HDLC SERPL: 30.5 % (ref 20–50)
LDLC SERPL CALC-MCNC: 87.4 MG/DL (ref 63–159)
MAGNESIUM SERPL-MCNC: 1.9 MG/DL (ref 1.6–2.6)
NONHDLC SERPL-MCNC: 105 MG/DL
POTASSIUM SERPL-SCNC: 4.1 MMOL/L (ref 3.5–5.1)
PROT SERPL-MCNC: 6.8 G/DL (ref 6–8.4)
SODIUM SERPL-SCNC: 138 MMOL/L (ref 136–145)
TRIGL SERPL-MCNC: 88 MG/DL (ref 30–150)

## 2019-05-31 PROCEDURE — 82550 ASSAY OF CK (CPK): CPT

## 2019-05-31 PROCEDURE — 80061 LIPID PANEL: CPT

## 2019-05-31 PROCEDURE — 83735 ASSAY OF MAGNESIUM: CPT

## 2019-05-31 PROCEDURE — 36415 COLL VENOUS BLD VENIPUNCTURE: CPT | Mod: PN

## 2019-05-31 PROCEDURE — 80053 COMPREHEN METABOLIC PANEL: CPT

## 2019-06-13 ENCOUNTER — PATIENT OUTREACH (OUTPATIENT)
Dept: ADMINISTRATIVE | Facility: HOSPITAL | Age: 49
End: 2019-06-13

## 2019-06-13 NOTE — LETTER
June 21, 2019    Steven Stephen  [address] [facility] [address]             [facility]  [address]  [facility] [address]  Phone: [phone] Dear Mr. Stephen:    We have tried to reach you by mychart unsuccessfully.    [facility] is committed to your overall health.  To help you get the most out of each of your visits, we will review your information to make sure you are up to date on all of your recommended tests and or procedures.       Dr. Deonte Miramontes MD has found that you may be due for:     Colonoscopy     After reviewing your chart, it has been documented that a FIT KIT (colorectal cancer screening kit) was ordered on 1/25/19. As of today it has not been dispensed to you. Please contact Deonte Miramontes MD to speak to someone about picking up the kit.         If you have had any of the above done at another facility, please bring the records or information with you so that your record at [facility] will be complete and up to date.     If you have not had any of these tests or procedures done recently and would like to complete this testing before your appointment on 6/27/19 at 8:30 am with Deonte Miramontes MD please call [phone] or send a message through your [facility] portal to your provider's office.     If you are currently taking medication, please bring it with you to your appointment for review.         Please feel free to call the number listed below if you have any questions.     Thanks,     MD Figueroa Michel LPN Clinical Care Coordinator   [facility]   [address] [facility] [address]   [place] [address]   [phone] (p)   [phone] (f)

## 2019-06-27 ENCOUNTER — OFFICE VISIT (OUTPATIENT)
Dept: FAMILY MEDICINE | Facility: CLINIC | Age: 49
End: 2019-06-27
Payer: COMMERCIAL

## 2019-06-27 VITALS
DIASTOLIC BLOOD PRESSURE: 78 MMHG | TEMPERATURE: 98 F | SYSTOLIC BLOOD PRESSURE: 112 MMHG | WEIGHT: 224.19 LBS | HEART RATE: 68 BPM | BODY MASS INDEX: 28.77 KG/M2 | HEIGHT: 74 IN | OXYGEN SATURATION: 99 %

## 2019-06-27 DIAGNOSIS — J30.2 SEASONAL ALLERGIES: ICD-10-CM

## 2019-06-27 DIAGNOSIS — E66.3 OVERWEIGHT (BMI 25.0-29.9): ICD-10-CM

## 2019-06-27 DIAGNOSIS — E78.2 MIXED HYPERLIPIDEMIA: ICD-10-CM

## 2019-06-27 DIAGNOSIS — Z95.5 HISTORY OF CORONARY ARTERY STENT PLACEMENT: ICD-10-CM

## 2019-06-27 DIAGNOSIS — I10 ESSENTIAL HYPERTENSION: Primary | ICD-10-CM

## 2019-06-27 DIAGNOSIS — Z12.11 COLON CANCER SCREENING: ICD-10-CM

## 2019-06-27 DIAGNOSIS — Z23 NEED FOR SHINGLES VACCINE: ICD-10-CM

## 2019-06-27 DIAGNOSIS — E78.5 DYSLIPIDEMIA (HIGH LDL; LOW HDL): ICD-10-CM

## 2019-06-27 DIAGNOSIS — F41.9 ANXIETY: ICD-10-CM

## 2019-06-27 DIAGNOSIS — Z82.49 FAMILY HISTORY OF CORONARY ARTERY DISEASE: ICD-10-CM

## 2019-06-27 PROCEDURE — 99999 PR PBB SHADOW E&M-EST. PATIENT-LVL III: ICD-10-PCS | Mod: PBBFAC,,, | Performed by: INTERNAL MEDICINE

## 2019-06-27 PROCEDURE — 99999 PR PBB SHADOW E&M-EST. PATIENT-LVL III: CPT | Mod: PBBFAC,,, | Performed by: INTERNAL MEDICINE

## 2019-06-27 PROCEDURE — 3008F PR BODY MASS INDEX (BMI) DOCUMENTED: ICD-10-PCS | Mod: CPTII,S$GLB,, | Performed by: INTERNAL MEDICINE

## 2019-06-27 PROCEDURE — 99214 PR OFFICE/OUTPT VISIT, EST, LEVL IV, 30-39 MIN: ICD-10-PCS | Mod: S$GLB,,, | Performed by: INTERNAL MEDICINE

## 2019-06-27 PROCEDURE — 3074F SYST BP LT 130 MM HG: CPT | Mod: CPTII,S$GLB,, | Performed by: INTERNAL MEDICINE

## 2019-06-27 PROCEDURE — 3078F DIAST BP <80 MM HG: CPT | Mod: CPTII,S$GLB,, | Performed by: INTERNAL MEDICINE

## 2019-06-27 PROCEDURE — 3074F PR MOST RECENT SYSTOLIC BLOOD PRESSURE < 130 MM HG: ICD-10-PCS | Mod: CPTII,S$GLB,, | Performed by: INTERNAL MEDICINE

## 2019-06-27 PROCEDURE — 3008F BODY MASS INDEX DOCD: CPT | Mod: CPTII,S$GLB,, | Performed by: INTERNAL MEDICINE

## 2019-06-27 PROCEDURE — 3078F PR MOST RECENT DIASTOLIC BLOOD PRESSURE < 80 MM HG: ICD-10-PCS | Mod: CPTII,S$GLB,, | Performed by: INTERNAL MEDICINE

## 2019-06-27 PROCEDURE — 99214 OFFICE O/P EST MOD 30 MIN: CPT | Mod: S$GLB,,, | Performed by: INTERNAL MEDICINE

## 2019-06-27 RX ORDER — ROSUVASTATIN CALCIUM 40 MG/1
40 TABLET, COATED ORAL NIGHTLY
Qty: 90 TABLET | Refills: 1 | Status: SHIPPED | OUTPATIENT
Start: 2019-06-27 | End: 2019-12-11 | Stop reason: SDUPTHER

## 2019-06-27 NOTE — PATIENT INSTRUCTIONS
Essential hypertension.Maintain < 2 Gm Na a day diet, and monitor BP at home; keep a log. Same tx.   -     rosuvastatin (CRESTOR) 40 MG Tab; Take 1 tablet (40 mg total) by mouth every evening.  Dispense: 90 tablet; Refill: 1  -     Comprehensive metabolic panel; Future; Expected date: 06/27/2019  -     CK; Future; Expected date: 06/27/2019  -     Lipid panel; Future; Expected date: 06/27/2019  -     Magnesium; Future; Expected date: 06/27/2019    Mixed hyperlipidemia.Maintain low fat high fiber diet, exercise regularly. Increase rosuvastatin to 40 mg a day; add QUNOL 100 mg a day otc.   -     rosuvastatin (CRESTOR) 40 MG Tab; Take 1 tablet (40 mg total) by mouth every evening.  Dispense: 90 tablet; Refill: 1  -     Comprehensive metabolic panel; Future; Expected date: 06/27/2019  -     CK; Future; Expected date: 06/27/2019  -     Lipid panel; Future; Expected date: 06/27/2019    History of coronary artery stent placement; has been stable; sees Dr Hackett. Same tx. Resume ASA 81 mg a day. Cont plavix.   -     rosuvastatin (CRESTOR) 40 MG Tab; Take 1 tablet (40 mg total) by mouth every evening.  Dispense: 90 tablet; Refill: 1  -     Comprehensive metabolic panel; Future; Expected date: 06/27/2019  -     CK; Future; Expected date: 06/27/2019  -     Lipid panel; Future; Expected date: 06/27/2019    Dyslipidemia (high LDL; low HDL); as above.  -     rosuvastatin (CRESTOR) 40 MG Tab; Take 1 tablet (40 mg total) by mouth every evening.  Dispense: 90 tablet; Refill: 1  -     Comprehensive metabolic panel; Future; Expected date: 06/27/2019  -     CK; Future; Expected date: 06/27/2019  -     Lipid panel; Future; Expected date: 06/27/2019    Overweight (BMI 25.0-29.9).Caloric restriction w regular exercise and weight reduction.    Family history of coronary artery disease  -     rosuvastatin (CRESTOR) 40 MG Tab; Take 1 tablet (40 mg total) by mouth every evening.  Dispense: 90 tablet; Refill: 1    Seasonal allergies; claritin 10  mg a day as needed for congestion or zyrtec 10 mg a day as needed. Mucinex plain for any sinus HA's ; tylenol for pain.     Anxiety; limits caffeine; has buspar prn if needed.     Colon cancer screen; recent coronary stent 1/2019; prefer to check iFOBT at present. Kit given to pt.     Shingles vaccine need; given script for shingrix #1; to call in 6 mos for #2 if tolerated well.

## 2019-06-27 NOTE — PROGRESS NOTES
Subjective:       Patient ID: Steven Stephen is a 49 y.o. male.    Chief Complaint: Follow-up (OV 03/29/2019)    HPI  here today for reassessment and go over his blood work.  Overall patient has been doing fine  Essential hypertension:  On less than 2 g sodium diet or at least tries; blood pressure here manually is controlled 112/78; blood pressure at home has been averaging-  115-125/70-79  ; tolerates amlodipine and telmisartan fine. Here manually 112/78; feels fine. Not fatigued; CPK wnl.   Mixed hyperlipidemia/dyslipidemia:  On low-fat high-fiber diet; tolerates rosuvastatin fine.  Coronary stent 1/17/19; Dr Hackett, has been stable.   Anxiety:  Knows to limit his caffeine intake; patient is been prescribed BuSpar to take as needed for anxiety    Review of Systems   Constitutional: Negative for appetite change and unexpected weight change.   HENT: Negative for congestion, postnasal drip, rhinorrhea and sinus pressure.          seasonal allergies,    Eyes: Negative for discharge and itching.   Respiratory: Negative for cough, chest tightness, shortness of breath and wheezing.    Cardiovascular: Negative for chest pain, palpitations and leg swelling.   Gastrointestinal: Negative for abdominal pain, blood in stool, constipation, diarrhea, nausea and vomiting.   Endocrine: Negative for polydipsia, polyphagia and polyuria.   Genitourinary: Negative for dysuria and hematuria.   Musculoskeletal: Negative for arthralgias and myalgias.   Skin: Negative for rash.   Allergic/Immunologic: Negative for environmental allergies and food allergies.   Neurological: Positive for headaches. Negative for tremors and seizures.        Suspects due to seasonal allergy flare up at present; has not been using Flonase nasal quit yrs ago due to nose bleed which has ceased with stopping the Flonase.   Hematological: Negative for adenopathy. Does not bruise/bleed easily.   Psychiatric/Behavioral:        Denies anxiety or depression.      "  Objective:      Vitals:    06/27/19 0842   BP: 112/78   BP Location: Right arm   Patient Position: Sitting   BP Method: Large (Manual)   Pulse: 68   Temp: 98.2 °F (36.8 °C)   TempSrc: Oral   SpO2: 99%   Weight: 101.7 kg (224 lb 3.3 oz)   Height: 6' 2" (1.88 m)     Body mass index is 28.79 kg/m².    Physical Exam   Constitutional: He is oriented to person, place, and time. He appears well-developed and well-nourished.   HENT:   Head: Normocephalic and atraumatic.   Eyes: EOM are normal.   Neck: Normal range of motion. Neck supple. No thyromegaly present.   Cardiovascular: Normal rate, regular rhythm and normal heart sounds. Exam reveals no gallop.   No murmur heard.  Pulmonary/Chest: Effort normal and breath sounds normal. No respiratory distress. He has no wheezes. He has no rales.   Abdominal: Soft. Bowel sounds are normal. He exhibits no distension. There is no tenderness. There is no rebound and no guarding.   Musculoskeletal: Normal range of motion. He exhibits no edema.   Lymphadenopathy:     He has no cervical adenopathy.   Neurological: He is alert and oriented to person, place, and time.   Moves all 4 extremities fine.   Skin: No rash noted.   Psychiatric: He has a normal mood and affect. His behavior is normal. Thought content normal.   Vitals reviewed.      Assessment:       1. Essential hypertension    2. Mixed hyperlipidemia    3. History of coronary artery stent placement    4. Dyslipidemia (high LDL; low HDL)    5. Overweight (BMI 25.0-29.9)    6. Family history of coronary artery disease    7. Seasonal allergies    8. Anxiety    9. Colon cancer screening    10. Need for shingles vaccine       Plan:       Essential hypertension.Maintain < 2 Gm Na a day diet, and monitor BP at home; keep a log. Same tx.   -     rosuvastatin (CRESTOR) 40 MG Tab; Take 1 tablet (40 mg total) by mouth every evening.  Dispense: 90 tablet; Refill: 1  -     Comprehensive metabolic panel; Future; Expected date: 06/27/2019  -  "    CK; Future; Expected date: 06/27/2019  -     Lipid panel; Future; Expected date: 06/27/2019  -     Magnesium; Future; Expected date: 06/27/2019    Mixed hyperlipidemia.Maintain low fat high fiber diet, exercise regularly and weight reduction. Increase rosuvastatin to 40 mg a day; add QUNOL 100 mg a day otc.  Check lipid level as well as comprehensive metabolic profile and CPK before follow-up  -     rosuvastatin (CRESTOR) 40 MG Tab; Take 1 tablet (40 mg total) by mouth every evening.  Dispense: 90 tablet; Refill: 1  -     Comprehensive metabolic panel; Future; Expected date: 06/27/2019  -     CK; Future; Expected date: 06/27/2019  -     Lipid panel; Future; Expected date: 06/27/2019    History of coronary artery stent placement; has been stable; sees Dr Hackett. Same tx. Resume ASA 81 mg a day. Cont plavix.   -     rosuvastatin (CRESTOR) 40 MG Tab; Take 1 tablet (40 mg total) by mouth every evening.  Dispense: 90 tablet; Refill: 1  -     Comprehensive metabolic panel; Future; Expected date: 06/27/2019  -     CK; Future; Expected date: 06/27/2019  -     Lipid panel; Future; Expected date: 06/27/2019    Dyslipidemia (high LDL; low HDL); as above.  -     rosuvastatin (CRESTOR) 40 MG Tab; Take 1 tablet (40 mg total) by mouth every evening.  Dispense: 90 tablet; Refill: 1  -     Comprehensive metabolic panel; Future; Expected date: 06/27/2019  -     CK; Future; Expected date: 06/27/2019  -     Lipid panel; Future; Expected date: 06/27/2019    Overweight (BMI 25.0-29.9).Caloric restriction w regular exercise and weight reduction.    Family history of coronary artery disease  -     rosuvastatin (CRESTOR) 40 MG Tab; Take 1 tablet (40 mg total) by mouth every evening.  Dispense: 90 tablet; Refill: 1    Seasonal allergies; claritin 10 mg a day as needed for congestion or zyrtec 10 mg a day as needed. Mucinex plain for any sinus HA's ; tylenol for pain.     Anxiety; limits caffeine; has buspar prn if needed.     Colon cancer  screen; recent coronary stent 1/2019; prefer to check iFOBT at present. Kit given to pt.     Shingles vaccine need; given script for shingrix #1; to call in 6 mos for #2 if tolerated well.

## 2019-08-06 ENCOUNTER — LAB VISIT (OUTPATIENT)
Dept: LAB | Facility: HOSPITAL | Age: 49
End: 2019-08-06
Attending: INTERNAL MEDICINE
Payer: COMMERCIAL

## 2019-08-06 DIAGNOSIS — Z12.11 COLON CANCER SCREENING: ICD-10-CM

## 2019-08-06 PROCEDURE — 82274 ASSAY TEST FOR BLOOD FECAL: CPT

## 2019-08-12 LAB — HEMOCCULT STL QL IA: NEGATIVE

## 2019-08-13 ENCOUNTER — PATIENT OUTREACH (OUTPATIENT)
Dept: ADMINISTRATIVE | Facility: HOSPITAL | Age: 49
End: 2019-08-13

## 2019-08-20 ENCOUNTER — LAB VISIT (OUTPATIENT)
Dept: LAB | Facility: HOSPITAL | Age: 49
End: 2019-08-20
Attending: INTERNAL MEDICINE
Payer: COMMERCIAL

## 2019-08-20 DIAGNOSIS — E78.5 DYSLIPIDEMIA (HIGH LDL; LOW HDL): ICD-10-CM

## 2019-08-20 DIAGNOSIS — E78.2 MIXED HYPERLIPIDEMIA: ICD-10-CM

## 2019-08-20 DIAGNOSIS — I10 ESSENTIAL HYPERTENSION: ICD-10-CM

## 2019-08-20 DIAGNOSIS — E66.3 OVERWEIGHT (BMI 25.0-29.9): ICD-10-CM

## 2019-08-20 DIAGNOSIS — Z95.5 HISTORY OF CORONARY ARTERY STENT PLACEMENT: ICD-10-CM

## 2019-08-20 PROCEDURE — 82550 ASSAY OF CK (CPK): CPT

## 2019-08-20 PROCEDURE — 80061 LIPID PANEL: CPT

## 2019-08-20 PROCEDURE — 84443 ASSAY THYROID STIM HORMONE: CPT

## 2019-08-20 PROCEDURE — 83735 ASSAY OF MAGNESIUM: CPT

## 2019-08-20 PROCEDURE — 80053 COMPREHEN METABOLIC PANEL: CPT

## 2019-08-20 PROCEDURE — 36415 COLL VENOUS BLD VENIPUNCTURE: CPT | Mod: PN

## 2019-08-21 LAB
ALBUMIN SERPL BCP-MCNC: 4 G/DL (ref 3.5–5.2)
ALP SERPL-CCNC: 57 U/L (ref 55–135)
ALT SERPL W/O P-5'-P-CCNC: 30 U/L (ref 10–44)
ANION GAP SERPL CALC-SCNC: 8 MMOL/L (ref 8–16)
AST SERPL-CCNC: 28 U/L (ref 10–40)
BILIRUB SERPL-MCNC: 0.7 MG/DL (ref 0.1–1)
BUN SERPL-MCNC: 16 MG/DL (ref 6–20)
CALCIUM SERPL-MCNC: 9.4 MG/DL (ref 8.7–10.5)
CHLORIDE SERPL-SCNC: 106 MMOL/L (ref 95–110)
CHOLEST SERPL-MCNC: 131 MG/DL (ref 120–199)
CHOLEST/HDLC SERPL: 3 {RATIO} (ref 2–5)
CK SERPL-CCNC: 112 U/L (ref 20–200)
CO2 SERPL-SCNC: 24 MMOL/L (ref 23–29)
CREAT SERPL-MCNC: 1.1 MG/DL (ref 0.5–1.4)
EST. GFR  (AFRICAN AMERICAN): >60 ML/MIN/1.73 M^2
EST. GFR  (NON AFRICAN AMERICAN): >60 ML/MIN/1.73 M^2
GLUCOSE SERPL-MCNC: 90 MG/DL (ref 70–110)
HDLC SERPL-MCNC: 43 MG/DL (ref 40–75)
HDLC SERPL: 32.8 % (ref 20–50)
LDLC SERPL CALC-MCNC: 69.6 MG/DL (ref 63–159)
MAGNESIUM SERPL-MCNC: 2.1 MG/DL (ref 1.6–2.6)
NONHDLC SERPL-MCNC: 88 MG/DL
POTASSIUM SERPL-SCNC: 4.5 MMOL/L (ref 3.5–5.1)
PROT SERPL-MCNC: 6.9 G/DL (ref 6–8.4)
SODIUM SERPL-SCNC: 138 MMOL/L (ref 136–145)
TRIGL SERPL-MCNC: 92 MG/DL (ref 30–150)
TSH SERPL DL<=0.005 MIU/L-ACNC: 1.44 UIU/ML (ref 0.4–4)

## 2019-08-27 ENCOUNTER — OFFICE VISIT (OUTPATIENT)
Dept: FAMILY MEDICINE | Facility: CLINIC | Age: 49
End: 2019-08-27
Payer: COMMERCIAL

## 2019-08-27 VITALS
SYSTOLIC BLOOD PRESSURE: 138 MMHG | HEIGHT: 74 IN | WEIGHT: 225.44 LBS | HEART RATE: 70 BPM | BODY MASS INDEX: 28.93 KG/M2 | DIASTOLIC BLOOD PRESSURE: 88 MMHG | TEMPERATURE: 98 F | OXYGEN SATURATION: 98 %

## 2019-08-27 DIAGNOSIS — I10 ESSENTIAL HYPERTENSION: Primary | ICD-10-CM

## 2019-08-27 DIAGNOSIS — Z82.49 FAMILY HISTORY OF CORONARY ARTERY DISEASE: ICD-10-CM

## 2019-08-27 DIAGNOSIS — J30.2 SEASONAL ALLERGIES: ICD-10-CM

## 2019-08-27 DIAGNOSIS — Z12.5 PROSTATE CANCER SCREENING: ICD-10-CM

## 2019-08-27 DIAGNOSIS — R51.9 SINUS HEADACHE: ICD-10-CM

## 2019-08-27 DIAGNOSIS — F41.1 GENERALIZED ANXIETY DISORDER: ICD-10-CM

## 2019-08-27 DIAGNOSIS — E78.5 DYSLIPIDEMIA (HIGH LDL; LOW HDL): ICD-10-CM

## 2019-08-27 PROCEDURE — 3075F PR MOST RECENT SYSTOLIC BLOOD PRESS GE 130-139MM HG: ICD-10-PCS | Mod: CPTII,S$GLB,, | Performed by: INTERNAL MEDICINE

## 2019-08-27 PROCEDURE — 99999 PR PBB SHADOW E&M-EST. PATIENT-LVL III: CPT | Mod: PBBFAC,,, | Performed by: INTERNAL MEDICINE

## 2019-08-27 PROCEDURE — 99214 OFFICE O/P EST MOD 30 MIN: CPT | Mod: S$GLB,,, | Performed by: INTERNAL MEDICINE

## 2019-08-27 PROCEDURE — 99999 PR PBB SHADOW E&M-EST. PATIENT-LVL III: ICD-10-PCS | Mod: PBBFAC,,, | Performed by: INTERNAL MEDICINE

## 2019-08-27 PROCEDURE — 3008F BODY MASS INDEX DOCD: CPT | Mod: CPTII,S$GLB,, | Performed by: INTERNAL MEDICINE

## 2019-08-27 PROCEDURE — 99214 PR OFFICE/OUTPT VISIT, EST, LEVL IV, 30-39 MIN: ICD-10-PCS | Mod: S$GLB,,, | Performed by: INTERNAL MEDICINE

## 2019-08-27 PROCEDURE — 3008F PR BODY MASS INDEX (BMI) DOCUMENTED: ICD-10-PCS | Mod: CPTII,S$GLB,, | Performed by: INTERNAL MEDICINE

## 2019-08-27 PROCEDURE — 3079F DIAST BP 80-89 MM HG: CPT | Mod: CPTII,S$GLB,, | Performed by: INTERNAL MEDICINE

## 2019-08-27 PROCEDURE — 3075F SYST BP GE 130 - 139MM HG: CPT | Mod: CPTII,S$GLB,, | Performed by: INTERNAL MEDICINE

## 2019-08-27 PROCEDURE — 3079F PR MOST RECENT DIASTOLIC BLOOD PRESSURE 80-89 MM HG: ICD-10-PCS | Mod: CPTII,S$GLB,, | Performed by: INTERNAL MEDICINE

## 2019-08-27 RX ORDER — LORATADINE 10 MG/1
10 TABLET ORAL DAILY
Qty: 30 TABLET | Refills: 1 | COMMUNITY
Start: 2019-08-27 | End: 2022-12-26

## 2019-08-27 NOTE — PATIENT INSTRUCTIONS
Essential hypertension. Maintain < 2 Gm Na a day diet, and monitor BP at home; keep a log.    Generalized anxiety disorder; limit caffeine; exercise w stress reduction; has buspar if needed.     Dyslipidemia (high LDL; low HDL).Maintain low fat high fiber diet, exercise regularly. Weight reduction where indicated. Cont rosuvastatin    Seasonal allergies; claritin and mucinex fro drainage; Simply saline as well for irrigation.     Sinus headaches; use mucinex for congestion; tylenol for pain. Claritin 10 mg a day for congestion. Simply saline as well will help w irrigation.     Family history of coronary artery disease    Prostate cancer screening.   -     PSA, Screening; Future; Expected date: 08/27/2019

## 2019-08-27 NOTE — PROGRESS NOTES
"Subjective:       Patient ID: Steven Stephen is a 49 y.o. male.    Chief Complaint: Follow-up (review lab results)    HPI  Patient has  been doing fine; here for reassessment and go over his labs.  Essential hypertension:  Watches his salt intake; blood pressure manually here is minimally elevated 138/88 with pulse 70; on telmisartan and amlodipine; blood pressure at home has been averaging-  117-124/72-82 at home.   Dyslipidemia:  Doing better with HDL 43 and LDL 69.6; total cholesterol is 131; with triglyceride 92.  Tolerates rosuvastatin fine.  Seasonal allergies:  Acting up lately; to use mucinex and claritin for relief w NS irrigation.   General anxiety disorder:  Doing okay knows limit his caffeine intake; uses BuSpar as needed for anxiety; helps.   Overweight:  BMI 28.94; goal for regular exercise along with caloric restriction to help his weight reduction.  Colon cancer screen: iFOBT 8/6/19 was negative. TC after 8/6/2020.     Review of Systems   Constitutional: Negative for fever and unexpected weight change.   HENT: Positive for congestion, postnasal drip, rhinorrhea and sinus pressure.         Seasonal allergies.    Respiratory: Negative for cough, chest tightness, shortness of breath and wheezing.    Cardiovascular: Negative for chest pain, palpitations and leg swelling.   Gastrointestinal: Negative for abdominal pain, blood in stool, constipation, diarrhea, nausea and vomiting.   Genitourinary: Negative for dysuria and hematuria.   Psychiatric/Behavioral: Negative for dysphoric mood. The patient is not nervous/anxious.        Objective:      Vitals:    08/27/19 1541   BP: 138/88   BP Location: Right arm   Patient Position: Sitting   BP Method: Large (Manual)   Pulse: 70   Temp: 98.1 °F (36.7 °C)   TempSrc: Oral   SpO2: 98%   Weight: 102.3 kg (225 lb 6.7 oz)   Height: 6' 2" (1.88 m)     Body mass index is 28.94 kg/m².    Physical Exam   Constitutional: He is oriented to person, place, and time. He " appears well-developed and well-nourished.   HENT:   Head: Normocephalic and atraumatic.   R TM obscured by wax; left w some congestion; to use Debrox ear irrigation. Throat pink and moist. NM swollen and inflamed w yel-white mucus.    Eyes: EOM are normal.   Neck: Normal range of motion. Neck supple. No thyromegaly present.   Cardiovascular: Normal rate, regular rhythm and normal heart sounds. Exam reveals no gallop.   No murmur heard.  Pulmonary/Chest: Effort normal and breath sounds normal. No respiratory distress. He has no wheezes. He has no rales.   Abdominal: Soft. Bowel sounds are normal. He exhibits no distension. There is no tenderness. There is no rebound and no guarding.   Musculoskeletal: Normal range of motion. He exhibits no edema.   Lymphadenopathy:     He has no cervical adenopathy.   Neurological: He is alert and oriented to person, place, and time.   Moves all 4 extremities fine.   Skin: No rash noted.   Psychiatric: He has a normal mood and affect. His behavior is normal. Thought content normal.   Vitals reviewed.      Assessment:       1. Essential hypertension    2. Generalized anxiety disorder    3. Dyslipidemia (high LDL; low HDL)    4. Seasonal allergies    5. Family history of coronary artery disease    6. Prostate cancer screening        Plan:       Essential hypertension. Maintain < 2 Gm Na a day diet, and monitor BP at home; keep a log.    Generalized anxiety disorder; limit caffeine; exercise w stress reduction; has buspar if needed.     Dyslipidemia (high LDL; low HDL).Maintain low fat high fiber diet, exercise regularly. Weight reduction where indicated. Cont rosuvastatin    Seasonal allergies; claritin and mucinex fro drainage; Simply saline as well for irrigation.     Sinus headaches; use mucinex for congestion; tylenol for pain. Claritin 10 mg a day for congestion. Simply saline as well will help w irrigation.     Family history of coronary artery disease    Prostate cancer  screening.   -     PSA, Screening; Future; Expected date: 08/27/2019

## 2019-08-28 ENCOUNTER — LAB VISIT (OUTPATIENT)
Dept: LAB | Facility: HOSPITAL | Age: 49
End: 2019-08-28
Attending: INTERNAL MEDICINE
Payer: COMMERCIAL

## 2019-08-28 DIAGNOSIS — Z12.5 PROSTATE CANCER SCREENING: ICD-10-CM

## 2019-08-28 LAB — COMPLEXED PSA SERPL-MCNC: 1.4 NG/ML (ref 0–4)

## 2019-08-28 PROCEDURE — 36415 COLL VENOUS BLD VENIPUNCTURE: CPT | Mod: PN

## 2019-08-28 PROCEDURE — 84153 ASSAY OF PSA TOTAL: CPT

## 2019-09-05 DIAGNOSIS — I10 ESSENTIAL HYPERTENSION: ICD-10-CM

## 2019-09-06 RX ORDER — AMLODIPINE BESYLATE 5 MG/1
TABLET ORAL
Qty: 90 TABLET | Refills: 3 | Status: SHIPPED | OUTPATIENT
Start: 2019-09-06 | End: 2020-09-02 | Stop reason: SDUPTHER

## 2019-09-23 PROBLEM — I25.10 CORONARY ARTERY DISEASE DUE TO LIPID RICH PLAQUE: Status: ACTIVE | Noted: 2019-09-23

## 2019-09-23 PROBLEM — I25.83 CORONARY ARTERY DISEASE DUE TO LIPID RICH PLAQUE: Status: ACTIVE | Noted: 2019-09-23

## 2019-10-14 ENCOUNTER — PATIENT OUTREACH (OUTPATIENT)
Dept: ADMINISTRATIVE | Facility: HOSPITAL | Age: 49
End: 2019-10-14

## 2019-10-28 ENCOUNTER — OFFICE VISIT (OUTPATIENT)
Dept: FAMILY MEDICINE | Facility: CLINIC | Age: 49
End: 2019-10-28
Payer: COMMERCIAL

## 2019-10-28 VITALS
TEMPERATURE: 99 F | OXYGEN SATURATION: 98 % | SYSTOLIC BLOOD PRESSURE: 118 MMHG | WEIGHT: 227.88 LBS | DIASTOLIC BLOOD PRESSURE: 82 MMHG | BODY MASS INDEX: 29.24 KG/M2 | HEART RATE: 72 BPM | HEIGHT: 74 IN

## 2019-10-28 DIAGNOSIS — I25.10 CORONARY ARTERY DISEASE DUE TO LIPID RICH PLAQUE: ICD-10-CM

## 2019-10-28 DIAGNOSIS — I10 ESSENTIAL HYPERTENSION: ICD-10-CM

## 2019-10-28 DIAGNOSIS — Z71.85 VACCINE COUNSELING: ICD-10-CM

## 2019-10-28 DIAGNOSIS — Z00.00 ANNUAL PHYSICAL EXAM: Primary | ICD-10-CM

## 2019-10-28 DIAGNOSIS — E78.49 OTHER HYPERLIPIDEMIA: ICD-10-CM

## 2019-10-28 DIAGNOSIS — Z82.49 FAMILY HISTORY OF CORONARY ARTERY DISEASE: ICD-10-CM

## 2019-10-28 DIAGNOSIS — J30.2 SEASONAL ALLERGIES: ICD-10-CM

## 2019-10-28 DIAGNOSIS — Z12.11 COLON CANCER SCREENING: ICD-10-CM

## 2019-10-28 DIAGNOSIS — F41.1 GAD (GENERALIZED ANXIETY DISORDER): ICD-10-CM

## 2019-10-28 DIAGNOSIS — Z12.5 PROSTATE CANCER SCREENING: ICD-10-CM

## 2019-10-28 DIAGNOSIS — E78.5 DYSLIPIDEMIA: ICD-10-CM

## 2019-10-28 DIAGNOSIS — I25.83 CORONARY ARTERY DISEASE DUE TO LIPID RICH PLAQUE: ICD-10-CM

## 2019-10-28 PROCEDURE — 99396 PR PREVENTIVE VISIT,EST,40-64: ICD-10-PCS | Mod: S$GLB,,, | Performed by: INTERNAL MEDICINE

## 2019-10-28 PROCEDURE — 3074F PR MOST RECENT SYSTOLIC BLOOD PRESSURE < 130 MM HG: ICD-10-PCS | Mod: CPTII,S$GLB,, | Performed by: INTERNAL MEDICINE

## 2019-10-28 PROCEDURE — 3074F SYST BP LT 130 MM HG: CPT | Mod: CPTII,S$GLB,, | Performed by: INTERNAL MEDICINE

## 2019-10-28 PROCEDURE — 99999 PR PBB SHADOW E&M-EST. PATIENT-LVL III: ICD-10-PCS | Mod: PBBFAC,,, | Performed by: INTERNAL MEDICINE

## 2019-10-28 PROCEDURE — 99999 PR PBB SHADOW E&M-EST. PATIENT-LVL III: CPT | Mod: PBBFAC,,, | Performed by: INTERNAL MEDICINE

## 2019-10-28 PROCEDURE — 3079F PR MOST RECENT DIASTOLIC BLOOD PRESSURE 80-89 MM HG: ICD-10-PCS | Mod: CPTII,S$GLB,, | Performed by: INTERNAL MEDICINE

## 2019-10-28 PROCEDURE — 99396 PREV VISIT EST AGE 40-64: CPT | Mod: S$GLB,,, | Performed by: INTERNAL MEDICINE

## 2019-10-28 PROCEDURE — 3079F DIAST BP 80-89 MM HG: CPT | Mod: CPTII,S$GLB,, | Performed by: INTERNAL MEDICINE

## 2019-10-28 NOTE — PATIENT INSTRUCTIONS
Annual Wellness Plan:  Maintain healthy lifestyle choices; regular exercise with caloric restriction where needed to lose weight.  Limit caffeine and alcohol intake when needed.  Keep follow up appointments with providers as directed and obtain workups as recommended as well. Obtain annual physical exam.    Annual physical exam    Essential hypertension.Maintain < 2 Gm Na a day diet, and monitor BP at home; keep a log.  -     Comprehensive metabolic panel; Future; Expected date: 10/28/2019  -     Lipid panel; Future; Expected date: 10/28/2019  -     Urinalysis; Future; Expected date: 10/28/2019    [place] (generalized anxiety disorder).Limit caffeine intake with stress reduction and regular exercise as tolerated. Has buspar to use if needed.     Other hyperlipidemia.Maintain low fat high fiber diet, exercise regularly. Weight reduction where indicated. On rosuvastatin.   -     Comprehensive metabolic panel; Future; Expected date: 10/28/2019  -     Lipid panel; Future; Expected date: 10/28/2019  -     Urinalysis; Future; Expected date: 10/28/2019    Dyslipidemia; asa above.   -     Comprehensive metabolic panel; Future; Expected date: 10/28/2019  -     Lipid panel; Future; Expected date: 10/28/2019  -     Urinalysis; Future; Expected date: 10/28/2019    Coronary artery disease, S/P stent 1/19/2019; sees Dr Hackett as his cardiologist. On plavix/ASA, and telmisartan.     Family history of coronary artery disease    Seasonal allergies; no decongestants; can use claritin.  And flonase as needed for congestion.     Colon cancer screening; TC after 8/6/2020; iFROBT neg 8/6/19.    Prostate cancer screening: PSA 8/28/19 PSA 1.4; recommend yearly check. 8/28/2020 next due.     Vaccine counseling; given 2nd order for Shingrix #2 vaccine to get at pharmacist.

## 2019-10-28 NOTE — PROGRESS NOTES
Subjective:       Patient ID: Steven Stephen is a 49 y.o. male.      Patient here today to perform his Annual Wellness evaluation with me.  Past medical history and surgical history delineated and noted. Social medical history and family medical history also delineated and noted.  Review of systems obtained at length prior to physical exam being performed.  Medications reviewed as well and addressed.  Labs reviewed and ordered for follow-up as needed.      Chief Complaint: Annual Exam    HPI  overall patient has been doing fine here today for his annual physical exam.  Please see areas above reviewed and updated.  Colon cancer screening:  I FOBT 08/06/2019 was negative; total colonoscopy is due after 08/06/2020.  Prostate cancer screening 08/28/2019 PSA was 1.4 due in the fall of 20/20 after 828.  Essential hypertension:  Watches his salt intake; blood pressure manually is 118/82 here today.  On amlodipine, metoprolol, and telmisartan.  Other hyperlipidemia/dyslipidemia:  With benefit a low-fat high-fiber diet; tolerates rosuvastatin 40 mg daily fine  CAD/cvoronary stent [place] 1/17/2019; Nuc EST 2 weeks ago this Wed. ; did fine; no ischemic changes noted. LVEF 69%. Dr Hackett his cardiologist.   Overweight:  BMI 29.25; goal is minimum exercise 5 times a week 25-30 minutes along with caloric restriction help his weight come down.  General anxiety disorder:  Knows to limit his caffeine intake and the benefits regular exercise; he also has BuSpar to use as needed for anxiety; knows to limit his caffeine intake  Vaccine update:  Needs be given a script for the 2nd Shingrix vaccine to complete the series.  Rest of the other vaccines are up-to-date    Review of Systems   Constitutional: Negative for appetite change and unexpected weight change.   HENT: Negative for congestion, postnasal drip, rhinorrhea and sinus pressure.          seasonal allergies,    Eyes: Negative for discharge and itching.   Respiratory: Negative for  "cough, chest tightness, shortness of breath and wheezing.    Cardiovascular: Negative for chest pain, palpitations and leg swelling.   Gastrointestinal: Negative for abdominal pain, blood in stool, constipation, diarrhea, nausea and vomiting.   Endocrine: Negative for polydipsia, polyphagia and polyuria.   Genitourinary: Negative for dysuria and hematuria.   Musculoskeletal: Negative for arthralgias and myalgias.   Skin: Negative for rash.   Allergic/Immunologic: Negative for environmental allergies and food allergies.   Neurological: Negative for tremors, seizures and headaches.        Occ sinus HA.   Hematological: Negative for adenopathy. Bruises/bleeds easily.        On plavix/ASA.    Psychiatric/Behavioral:        Anxiety controlled. No depression       Objective:      Vitals:    10/28/19 0826   BP: 118/82   BP Location: Right arm   Patient Position: Sitting   BP Method: Large (Manual)   Pulse: 72   Temp: 98.6 °F (37 °C)   TempSrc: Oral   SpO2: 98%   Weight: 103.4 kg (227 lb 13.5 oz)   Height: 6' 2" (1.88 m)     Body mass index is 29.25 kg/m².    Physical Exam   Constitutional: He is oriented to person, place, and time. He appears well-developed and well-nourished.   HENT:   Head: Normocephalic and atraumatic.   Throat pink and moist. TM's left pink; left obscured by wax; NM non-swollen and pink.    Eyes: EOM are normal.   Neck: Normal range of motion. Neck supple. No thyromegaly present.   No carotid bruits heard.    Cardiovascular: Normal rate, regular rhythm and normal heart sounds. Exam reveals no gallop.   No murmur heard.  Pulmonary/Chest: Effort normal and breath sounds normal. No respiratory distress. He has no wheezes. He has no rales.   Abdominal: Soft. Bowel sounds are normal. He exhibits no distension. There is no tenderness. There is no rebound and no guarding.   Musculoskeletal: Normal range of motion. He exhibits no edema.   No C-T-L-S sp tenderness to palp.    Lymphadenopathy:     He has no " cervical adenopathy.   Neurological: He is alert and oriented to person, place, and time.   Moves all 4 extremities fine.   Skin: No rash noted.   Psychiatric: He has a normal mood and affect. His behavior is normal. Thought content normal.   Vitals reviewed.      Assessment:       1. Annual physical exam    2. Essential hypertension    3. [place] (generalized anxiety disorder)    4. Other hyperlipidemia    5. Dyslipidemia    6. Coronary artery disease, S/P stent 1/19    7. Family history of coronary artery disease    8. Seasonal allergies    9. Colon cancer screening    10. Prostate cancer screening    11. Vaccine counseling        Plan:       Annual Wellness Plan:  Maintain healthy lifestyle choices; regular exercise with caloric restriction where needed to lose weight.  Limit caffeine and alcohol intake when needed.  Keep follow up appointments with providers as directed and obtain workups as recommended as well. Obtain annual physical exam.    Annual physical exam    Essential hypertension.Maintain < 2 Gm Na a day diet, and monitor BP at home; keep a log.  -     Comprehensive metabolic panel; Future; Expected date: 10/28/2019  -     Lipid panel; Future; Expected date: 10/28/2019  -     Urinalysis; Future; Expected date: 10/28/2019    [place] (generalized anxiety disorder).Limit caffeine intake with stress reduction and regular exercise as tolerated. Has buspar to use if needed.     Other hyperlipidemia.Maintain low fat high fiber diet, exercise regularly. Weight reduction where indicated. On rosuvastatin. Lipid update needed and CMP.   -     Comprehensive metabolic panel; Future; Expected date: 10/28/2019  -     Lipid panel; Future; Expected date: 10/28/2019  -     Urinalysis; Future; Expected date: 10/28/2019    Dyslipidemia; asa above.   -     Comprehensive metabolic panel; Future; Expected date: 10/28/2019  -     Lipid panel; Future; Expected date: 10/28/2019  -     Urinalysis; Future; Expected date:  10/28/2019    Coronary artery disease, S/P stent 1/19/2019; sees Dr Hackett as his cardiologist. On plavix/ASA, and telmisartan.     Family history of coronary artery disease    Seasonal allergies; no decongestants; can use claritin.  And flonase as needed for congestion.     Colon cancer screening; TC after 8/6/2020; iFROBT neg 8/6/19.    Prostate cancer screening: PSA 8/28/19 PSA 1.4; recommend yearly check. 8/28/2020 next due.     Vaccine counseling; given 2nd order for Shingrix #2 vaccine to get at pharmacist.

## 2019-12-03 ENCOUNTER — LAB VISIT (OUTPATIENT)
Dept: LAB | Facility: HOSPITAL | Age: 49
End: 2019-12-03
Attending: INTERNAL MEDICINE
Payer: COMMERCIAL

## 2019-12-03 DIAGNOSIS — F41.1 GAD (GENERALIZED ANXIETY DISORDER): ICD-10-CM

## 2019-12-03 DIAGNOSIS — E78.5 DYSLIPIDEMIA: ICD-10-CM

## 2019-12-03 DIAGNOSIS — E78.49 OTHER HYPERLIPIDEMIA: ICD-10-CM

## 2019-12-03 DIAGNOSIS — I10 ESSENTIAL HYPERTENSION: ICD-10-CM

## 2019-12-03 LAB
ALBUMIN SERPL BCP-MCNC: 3.9 G/DL (ref 3.5–5.2)
ALP SERPL-CCNC: 67 U/L (ref 55–135)
ALT SERPL W/O P-5'-P-CCNC: 64 U/L (ref 10–44)
ANION GAP SERPL CALC-SCNC: 8 MMOL/L (ref 8–16)
AST SERPL-CCNC: 38 U/L (ref 10–40)
BASOPHILS # BLD AUTO: 0.04 K/UL (ref 0–0.2)
BASOPHILS NFR BLD: 0.7 % (ref 0–1.9)
BILIRUB SERPL-MCNC: 0.5 MG/DL (ref 0.1–1)
BUN SERPL-MCNC: 16 MG/DL (ref 6–20)
CALCIUM SERPL-MCNC: 9.1 MG/DL (ref 8.7–10.5)
CHLORIDE SERPL-SCNC: 106 MMOL/L (ref 95–110)
CHOLEST SERPL-MCNC: 131 MG/DL (ref 120–199)
CHOLEST/HDLC SERPL: 2.6 {RATIO} (ref 2–5)
CO2 SERPL-SCNC: 26 MMOL/L (ref 23–29)
CREAT SERPL-MCNC: 1 MG/DL (ref 0.5–1.4)
DIFFERENTIAL METHOD: NORMAL
EOSINOPHIL # BLD AUTO: 0.2 K/UL (ref 0–0.5)
EOSINOPHIL NFR BLD: 3.7 % (ref 0–8)
ERYTHROCYTE [DISTWIDTH] IN BLOOD BY AUTOMATED COUNT: 12.4 % (ref 11.5–14.5)
EST. GFR  (AFRICAN AMERICAN): >60 ML/MIN/1.73 M^2
EST. GFR  (NON AFRICAN AMERICAN): >60 ML/MIN/1.73 M^2
GLUCOSE SERPL-MCNC: 90 MG/DL (ref 70–110)
HCT VFR BLD AUTO: 48.6 % (ref 40–54)
HDLC SERPL-MCNC: 51 MG/DL (ref 40–75)
HDLC SERPL: 38.9 % (ref 20–50)
HGB BLD-MCNC: 15.6 G/DL (ref 14–18)
IMM GRANULOCYTES # BLD AUTO: 0.02 K/UL (ref 0–0.04)
IMM GRANULOCYTES NFR BLD AUTO: 0.4 % (ref 0–0.5)
LDLC SERPL CALC-MCNC: 65.6 MG/DL (ref 63–159)
LYMPHOCYTES # BLD AUTO: 1.8 K/UL (ref 1–4.8)
LYMPHOCYTES NFR BLD: 32.8 % (ref 18–48)
MCH RBC QN AUTO: 29.8 PG (ref 27–31)
MCHC RBC AUTO-ENTMCNC: 32.1 G/DL (ref 32–36)
MCV RBC AUTO: 93 FL (ref 82–98)
MONOCYTES # BLD AUTO: 0.5 K/UL (ref 0.3–1)
MONOCYTES NFR BLD: 8.2 % (ref 4–15)
NEUTROPHILS # BLD AUTO: 3 K/UL (ref 1.8–7.7)
NEUTROPHILS NFR BLD: 54.2 % (ref 38–73)
NONHDLC SERPL-MCNC: 80 MG/DL
NRBC BLD-RTO: 0 /100 WBC
PLATELET # BLD AUTO: 169 K/UL (ref 150–350)
PMV BLD AUTO: 11.4 FL (ref 9.2–12.9)
POTASSIUM SERPL-SCNC: 4.2 MMOL/L (ref 3.5–5.1)
PROT SERPL-MCNC: 7 G/DL (ref 6–8.4)
RBC # BLD AUTO: 5.23 M/UL (ref 4.6–6.2)
SODIUM SERPL-SCNC: 140 MMOL/L (ref 136–145)
TRIGL SERPL-MCNC: 72 MG/DL (ref 30–150)
TSH SERPL DL<=0.005 MIU/L-ACNC: 1.63 UIU/ML (ref 0.4–4)
WBC # BLD AUTO: 5.61 K/UL (ref 3.9–12.7)

## 2019-12-03 PROCEDURE — 80061 LIPID PANEL: CPT

## 2019-12-03 PROCEDURE — 80053 COMPREHEN METABOLIC PANEL: CPT

## 2019-12-03 PROCEDURE — 85025 COMPLETE CBC W/AUTO DIFF WBC: CPT

## 2019-12-03 PROCEDURE — 84443 ASSAY THYROID STIM HORMONE: CPT

## 2019-12-03 PROCEDURE — 36415 COLL VENOUS BLD VENIPUNCTURE: CPT | Mod: PN

## 2019-12-11 ENCOUNTER — OFFICE VISIT (OUTPATIENT)
Dept: FAMILY MEDICINE | Facility: CLINIC | Age: 49
End: 2019-12-11
Payer: COMMERCIAL

## 2019-12-11 VITALS
SYSTOLIC BLOOD PRESSURE: 118 MMHG | BODY MASS INDEX: 29.9 KG/M2 | HEIGHT: 74 IN | TEMPERATURE: 99 F | HEART RATE: 68 BPM | DIASTOLIC BLOOD PRESSURE: 82 MMHG | WEIGHT: 233 LBS | OXYGEN SATURATION: 98 %

## 2019-12-11 DIAGNOSIS — Z82.49 FAMILY HISTORY OF CORONARY ARTERY DISEASE: ICD-10-CM

## 2019-12-11 DIAGNOSIS — R74.01 TRANSAMINITIS: ICD-10-CM

## 2019-12-11 DIAGNOSIS — F41.1 GAD (GENERALIZED ANXIETY DISORDER): ICD-10-CM

## 2019-12-11 DIAGNOSIS — E78.49 OTHER HYPERLIPIDEMIA: ICD-10-CM

## 2019-12-11 DIAGNOSIS — E66.3 OVERWEIGHT (BMI 25.0-29.9): ICD-10-CM

## 2019-12-11 DIAGNOSIS — I25.83 CORONARY ARTERY DISEASE DUE TO LIPID RICH PLAQUE: ICD-10-CM

## 2019-12-11 DIAGNOSIS — I25.10 CORONARY ARTERY DISEASE DUE TO LIPID RICH PLAQUE: ICD-10-CM

## 2019-12-11 DIAGNOSIS — E78.5 DYSLIPIDEMIA: ICD-10-CM

## 2019-12-11 DIAGNOSIS — F41.8 SITUATIONAL ANXIETY: ICD-10-CM

## 2019-12-11 DIAGNOSIS — I10 ESSENTIAL HYPERTENSION: Primary | ICD-10-CM

## 2019-12-11 DIAGNOSIS — J30.2 SEASONAL ALLERGIES: ICD-10-CM

## 2019-12-11 PROCEDURE — 99999 PR PBB SHADOW E&M-EST. PATIENT-LVL III: CPT | Mod: PBBFAC,,, | Performed by: INTERNAL MEDICINE

## 2019-12-11 PROCEDURE — 3079F PR MOST RECENT DIASTOLIC BLOOD PRESSURE 80-89 MM HG: ICD-10-PCS | Mod: CPTII,S$GLB,, | Performed by: INTERNAL MEDICINE

## 2019-12-11 PROCEDURE — 3079F DIAST BP 80-89 MM HG: CPT | Mod: CPTII,S$GLB,, | Performed by: INTERNAL MEDICINE

## 2019-12-11 PROCEDURE — 3008F BODY MASS INDEX DOCD: CPT | Mod: CPTII,S$GLB,, | Performed by: INTERNAL MEDICINE

## 2019-12-11 PROCEDURE — 3074F PR MOST RECENT SYSTOLIC BLOOD PRESSURE < 130 MM HG: ICD-10-PCS | Mod: CPTII,S$GLB,, | Performed by: INTERNAL MEDICINE

## 2019-12-11 PROCEDURE — 99214 OFFICE O/P EST MOD 30 MIN: CPT | Mod: S$GLB,,, | Performed by: INTERNAL MEDICINE

## 2019-12-11 PROCEDURE — 3074F SYST BP LT 130 MM HG: CPT | Mod: CPTII,S$GLB,, | Performed by: INTERNAL MEDICINE

## 2019-12-11 PROCEDURE — 99214 PR OFFICE/OUTPT VISIT, EST, LEVL IV, 30-39 MIN: ICD-10-PCS | Mod: S$GLB,,, | Performed by: INTERNAL MEDICINE

## 2019-12-11 PROCEDURE — 99999 PR PBB SHADOW E&M-EST. PATIENT-LVL III: ICD-10-PCS | Mod: PBBFAC,,, | Performed by: INTERNAL MEDICINE

## 2019-12-11 PROCEDURE — 3008F PR BODY MASS INDEX (BMI) DOCUMENTED: ICD-10-PCS | Mod: CPTII,S$GLB,, | Performed by: INTERNAL MEDICINE

## 2019-12-11 RX ORDER — ROSUVASTATIN CALCIUM 40 MG/1
40 TABLET, COATED ORAL NIGHTLY
Qty: 90 TABLET | Refills: 1 | Status: SHIPPED | OUTPATIENT
Start: 2019-12-11 | End: 2020-04-27

## 2019-12-11 RX ORDER — TELMISARTAN 40 MG/1
40 TABLET ORAL DAILY
Qty: 90 TABLET | Refills: 3 | Status: SHIPPED | OUTPATIENT
Start: 2019-12-11 | End: 2020-12-30

## 2019-12-11 RX ORDER — METOPROLOL SUCCINATE 25 MG/1
25 TABLET, EXTENDED RELEASE ORAL DAILY
Qty: 90 TABLET | Refills: 3 | Status: SHIPPED | OUTPATIENT
Start: 2019-12-11 | End: 2020-12-23

## 2019-12-11 RX ORDER — BUSPIRONE HYDROCHLORIDE 15 MG/1
TABLET ORAL
Qty: 30 TABLET | Refills: 2 | Status: SHIPPED | OUTPATIENT
Start: 2019-12-11 | End: 2020-05-18

## 2019-12-11 NOTE — PROGRESS NOTES
"Subjective:       Patient ID: Steven Stephen is a 49 y.o. male.    Chief Complaint: Follow-up (review lab results)    HPI  patient here today for reassessment and go over his labs.  Essential hypertension:  Watches his salt intake; blood pressure manually he is controlled 118/82.  Blood pressure at home has been averaging-  About the same.   Other hyperlipidemia-dyslipidemia:  Knows the importance of low-fat high-fiber diet and regular exercise as well as trying to get his weight down.  Tolerates generic Crestor fine.  Coronary artery disease; status post stent January 17, 2019:  No complaints of chest pain, shortness of breath, or heart palpitations.  Patient is on metoprolol, telmisartan, rosuvastatin, generic Plavix and aspirin.  His cardiologist is- Dr Hackett.   General anxiety disorder:  Knows to limit his caffeine intake.  Has BuSpar to use as needed for anxiety. Uses at work; helps. 2-3 days a week 1/3 of tab. Buspar renewed.   Transaminitis: mild; remain well hydrated; recently used tylenol couple times for HA. Will reassess in 3 mos.       Review of Systems   Constitutional: Negative for fever and unexpected weight change.   HENT: Negative for congestion, postnasal drip and rhinorrhea.    Respiratory: Negative for cough, chest tightness, shortness of breath and wheezing.    Cardiovascular: Negative for chest pain, palpitations and leg swelling.   Gastrointestinal: Negative for abdominal pain, blood in stool, constipation, diarrhea, nausea and vomiting.   Genitourinary: Negative for dysuria and hematuria.   Psychiatric/Behavioral: Negative for dysphoric mood. The patient is not nervous/anxious.        Objective:      Vitals:    12/11/19 0915   BP: 118/82   BP Location: Left arm   Patient Position: Sitting   BP Method: Large (Manual)   Pulse: 68   Temp: 99 °F (37.2 °C)   TempSrc: Oral   SpO2: 98%   Weight: 105.7 kg (233 lb 0.4 oz)   Height: 6' 2" (1.88 m)     Body mass index is 29.92 kg/m².    Physical Exam "   Constitutional: He is oriented to person, place, and time. He appears well-developed and well-nourished.   HENT:   Head: Normocephalic and atraumatic.   Eyes: EOM are normal.   Neck: Normal range of motion. Neck supple. No thyromegaly present.   No carotid bruits heard.    Cardiovascular: Normal rate, regular rhythm and normal heart sounds. Exam reveals no gallop.   No murmur heard.  Pulmonary/Chest: Effort normal and breath sounds normal. No respiratory distress. He has no wheezes. He has no rales.   Abdominal: Soft. Bowel sounds are normal. He exhibits no distension. There is no tenderness. There is no rebound and no guarding.   Musculoskeletal: Normal range of motion. He exhibits no edema.   Lymphadenopathy:     He has no cervical adenopathy.   Neurological: He is alert and oriented to person, place, and time.   Moves all 4 extremities fine.   Skin: No rash noted.   Psychiatric: He has a normal mood and affect. His behavior is normal. Thought content normal.   Vitals reviewed.      Assessment:       1. Essential hypertension    2. CLEVELAND (generalized anxiety disorder)    3. Situational anxiety    4. Other hyperlipidemia    5. Dyslipidemia    6. Transaminitis    7. Coronary artery disease, S/P stent 1/19    8. Seasonal allergies    9. Family history of coronary artery disease    10. Overweight (BMI 25.0-29.9)        Plan:       Essential hypertension.Maintain < 2 Gm Na a day diet, and monitor BP at home; keep a log.  -     telmisartan (MICARDIS) 40 MG Tab; Take 1 tablet (40 mg total) by mouth once daily.  Dispense: 90 tablet; Refill: 3  -     metoprolol succinate (TOPROL-XL) 25 MG 24 hr tablet; Take 1 tablet (25 mg total) by mouth once daily.  Dispense: 90 tablet; Refill: 3  -     rosuvastatin (CRESTOR) 40 MG Tab; Take 1 tablet (40 mg total) by mouth every evening.  Dispense: 90 tablet; Refill: 1    CLEVELAND (generalized anxiety disorder);Limit caffeine intake with stress reduction and regular exercise as tolerated. Has  buspar to use as needed.     Situational anxiety  -     busPIRone (BUSPAR) 15 MG tablet; TAKE 1/3-1/2 TABLET BY MOUTH 3 TIMES A DAY AS NEEDED FOR ANXIETY.  Dispense: 30 tablet; Refill: 2    Other hyperlipidemia.Maintain low fat high fiber diet, exercise regularly. Weight reduction where indicated. Cont rosuvastatin for now; keep well hydrated. Limit tylenol and alcohol use. Milk thistle consideration.   -     Comprehensive metabolic panel; Future; Expected date: 12/11/2019  -     Lipid panel; Future; Expected date: 12/11/2019  -     rosuvastatin (CRESTOR) 40 MG Tab; Take 1 tablet (40 mg total) by mouth every evening.  Dispense: 90 tablet; Refill: 1    Dyslipidemia; as above.   -     Comprehensive metabolic panel; Future; Expected date: 12/11/2019  -     Lipid panel; Future; Expected date: 12/11/2019  -     rosuvastatin (CRESTOR) 40 MG Tab; Take 1 tablet (40 mg total) by mouth every evening.  Dispense: 90 tablet; Refill: 1    Transaminitis; kep well hydrated. Limit tylenol and alcohol use.   -     Comprehensive metabolic panel; Future; Expected date: 12/11/2019  -     Lipid panel; Future; Expected date: 12/11/2019    Coronary artery disease, S/P stent 1/19; has been stable; sees Dr Hackett as cardiologist.   -     telmisartan (MICARDIS) 40 MG Tab; Take 1 tablet (40 mg total) by mouth once daily.  Dispense: 90 tablet; Refill: 3  -     metoprolol succinate (TOPROL-XL) 25 MG 24 hr tablet; Take 1 tablet (25 mg total) by mouth once daily.  Dispense: 90 tablet; Refill: 3  -     rosuvastatin (CRESTOR) 40 MG Tab; Take 1 tablet (40 mg total) by mouth every evening.  Dispense: 90 tablet; Refill: 1    Seasonal allergies; has been stable; claritin or zyrtec as needed; for congestion; nasacort as needed for congestion    Family history of coronary artery disease  -     rosuvastatin (CRESTOR) 40 MG Tab; Take 1 tablet (40 mg total) by mouth every evening.  Dispense: 90 tablet; Refill: 1    Overweight (BMI 25.0-29.9).Caloric  restriction w regular exercise and weight reduction.

## 2019-12-11 NOTE — PATIENT INSTRUCTIONS
Essential hypertension.Maintain < 2 Gm Na a day diet, and monitor BP at home; keep a log.  -     telmisartan (MICARDIS) 40 MG Tab; Take 1 tablet (40 mg total) by mouth once daily.  Dispense: 90 tablet; Refill: 3  -     metoprolol succinate (TOPROL-XL) 25 MG 24 hr tablet; Take 1 tablet (25 mg total) by mouth once daily.  Dispense: 90 tablet; Refill: 3  -     rosuvastatin (CRESTOR) 40 MG Tab; Take 1 tablet (40 mg total) by mouth every evening.  Dispense: 90 tablet; Refill: 1    C[place]AND (generalized anxiety disorder);Limit caffeine intake with stress reduction and regular exercise as tolerated. Has buspar to use as needed.     Situational anxiety  -     busPIRone (BUSPAR) 15 MG tablet; TAKE 1/3-1/2 TABLET BY MOUTH 3 TIMES A DAY AS NEEDED FOR ANXIETY.  Dispense: 30 tablet; Refill: 2    Other hyperlipidemia.Maintain low fat high fiber diet, exercise regularly. Weight reduction where indicated. Cont rosuvastatin for now; keep well hydrated. Limit tylenol and alcohol use. Milk thistle consideration.   -     Comprehensive metabolic panel; Future; Expected date: 12/11/2019  -     Lipid panel; Future; Expected date: 12/11/2019  -     rosuvastatin (CRESTOR) 40 MG Tab; Take 1 tablet (40 mg total) by mouth every evening.  Dispense: 90 tablet; Refill: 1    Dyslipidemia; as above.   -     Comprehensive metabolic panel; Future; Expected date: 12/11/2019  -     Lipid panel; Future; Expected date: 12/11/2019  -     rosuvastatin (CRESTOR) 40 MG Tab; Take 1 tablet (40 mg total) by mouth every evening.  Dispense: 90 tablet; Refill: 1    Transaminitis; kep well hydrated. Limit tylenol and alcohol use.   -     Comprehensive metabolic panel; Future; Expected date: 12/11/2019  -     Lipid panel; Future; Expected date: 12/11/2019    Coronary artery disease, S/P stent 1/19; has been stable; sees Dr Hackett as cardiologist.   -     telmisartan (MICARDIS) 40 MG Tab; Take 1 tablet (40 mg total) by mouth once daily.  Dispense: 90 tablet; Refill: 3  -      metoprolol succinate (TOPROL-XL) 25 MG 24 hr tablet; Take 1 tablet (25 mg total) by mouth once daily.  Dispense: 90 tablet; Refill: 3  -     rosuvastatin (CRESTOR) 40 MG Tab; Take 1 tablet (40 mg total) by mouth every evening.  Dispense: 90 tablet; Refill: 1    Seasonal allergies; has been stable; claritin or zyrtec as needed; for congestion; nasacort as needed for congestion    Family history of coronary artery disease  -     rosuvastatin (CRESTOR) 40 MG Tab; Take 1 tablet (40 mg total) by mouth every evening.  Dispense: 90 tablet; Refill: 1    Overweight (BMI 25.0-29.9).Caloric restriction w regular exercise and weight reduction.

## 2020-03-13 ENCOUNTER — LAB VISIT (OUTPATIENT)
Dept: LAB | Facility: HOSPITAL | Age: 50
End: 2020-03-13
Attending: INTERNAL MEDICINE
Payer: COMMERCIAL

## 2020-03-13 DIAGNOSIS — E78.49 OTHER HYPERLIPIDEMIA: ICD-10-CM

## 2020-03-13 DIAGNOSIS — E78.5 DYSLIPIDEMIA: ICD-10-CM

## 2020-03-13 DIAGNOSIS — R74.01 TRANSAMINITIS: ICD-10-CM

## 2020-03-13 PROCEDURE — 80061 LIPID PANEL: CPT

## 2020-03-13 PROCEDURE — 36415 COLL VENOUS BLD VENIPUNCTURE: CPT | Mod: PN

## 2020-03-13 PROCEDURE — 80053 COMPREHEN METABOLIC PANEL: CPT

## 2020-03-14 LAB
ALBUMIN SERPL BCP-MCNC: 3.9 G/DL (ref 3.5–5.2)
ALP SERPL-CCNC: 67 U/L (ref 55–135)
ALT SERPL W/O P-5'-P-CCNC: 54 U/L (ref 10–44)
ANION GAP SERPL CALC-SCNC: 7 MMOL/L (ref 8–16)
AST SERPL-CCNC: 47 U/L (ref 10–40)
BILIRUB SERPL-MCNC: 0.7 MG/DL (ref 0.1–1)
BUN SERPL-MCNC: 13 MG/DL (ref 6–20)
CALCIUM SERPL-MCNC: 9.2 MG/DL (ref 8.7–10.5)
CHLORIDE SERPL-SCNC: 105 MMOL/L (ref 95–110)
CHOLEST SERPL-MCNC: 119 MG/DL (ref 120–199)
CHOLEST/HDLC SERPL: 2.4 {RATIO} (ref 2–5)
CO2 SERPL-SCNC: 28 MMOL/L (ref 23–29)
CREAT SERPL-MCNC: 1.1 MG/DL (ref 0.5–1.4)
EST. GFR  (AFRICAN AMERICAN): >60 ML/MIN/1.73 M^2
EST. GFR  (NON AFRICAN AMERICAN): >60 ML/MIN/1.73 M^2
GLUCOSE SERPL-MCNC: 95 MG/DL (ref 70–110)
HDLC SERPL-MCNC: 50 MG/DL (ref 40–75)
HDLC SERPL: 42 % (ref 20–50)
LDLC SERPL CALC-MCNC: 53 MG/DL (ref 63–159)
NONHDLC SERPL-MCNC: 69 MG/DL
POTASSIUM SERPL-SCNC: 4.4 MMOL/L (ref 3.5–5.1)
PROT SERPL-MCNC: 7 G/DL (ref 6–8.4)
SODIUM SERPL-SCNC: 140 MMOL/L (ref 136–145)
TRIGL SERPL-MCNC: 80 MG/DL (ref 30–150)

## 2020-03-18 ENCOUNTER — TELEPHONE (OUTPATIENT)
Dept: FAMILY MEDICINE | Facility: CLINIC | Age: 50
End: 2020-03-18

## 2020-03-18 DIAGNOSIS — R74.01 TRANSAMINITIS: ICD-10-CM

## 2020-03-18 DIAGNOSIS — E78.49 OTHER HYPERLIPIDEMIA: Primary | ICD-10-CM

## 2020-03-18 DIAGNOSIS — I25.83 CORONARY ARTERY DISEASE DUE TO LIPID RICH PLAQUE: ICD-10-CM

## 2020-03-18 DIAGNOSIS — I25.10 CORONARY ARTERY DISEASE DUE TO LIPID RICH PLAQUE: ICD-10-CM

## 2020-03-19 RX ORDER — EZETIMIBE 10 MG/1
TABLET ORAL
Qty: 45 TABLET | Refills: 2 | Status: SHIPPED | OUTPATIENT
Start: 2020-03-19 | End: 2020-12-23

## 2020-04-27 ENCOUNTER — TELEPHONE (OUTPATIENT)
Dept: FAMILY MEDICINE | Facility: CLINIC | Age: 50
End: 2020-04-27

## 2020-04-27 DIAGNOSIS — I25.83 CORONARY ARTERY DISEASE DUE TO LIPID RICH PLAQUE: ICD-10-CM

## 2020-04-27 DIAGNOSIS — E78.49 OTHER HYPERLIPIDEMIA: Primary | ICD-10-CM

## 2020-04-27 DIAGNOSIS — I25.10 CORONARY ARTERY DISEASE DUE TO LIPID RICH PLAQUE: ICD-10-CM

## 2020-04-27 RX ORDER — ROSUVASTATIN CALCIUM 20 MG/1
20 TABLET, COATED ORAL DAILY
Qty: 90 TABLET | Refills: 1 | Status: SHIPPED | OUTPATIENT
Start: 2020-04-27 | End: 2020-10-24

## 2020-04-27 NOTE — TELEPHONE ENCOUNTER
Rec'd RX request from [facility] for Crestor 20 mg tab  Med list indicates pt is on 40mg, last visit note states rx decreased to 20mg.  If pt is on 20mg please update med list and send to pharmacy.

## 2020-04-27 NOTE — TELEPHONE ENCOUNTER
In telephone message was dropped from 40 to 20 mg for rosuvastatin to try and help liver enzymes and Zetia was added at 5 mg daily ; please see 3/18/20 telephone note; correct dosage presently for rosuvastatin is 20 mg per day.  Patient did not need a refill at time of change; but requested now

## 2020-05-06 ENCOUNTER — PATIENT MESSAGE (OUTPATIENT)
Dept: ADMINISTRATIVE | Facility: HOSPITAL | Age: 50
End: 2020-05-06

## 2020-05-16 DIAGNOSIS — F41.8 SITUATIONAL ANXIETY: ICD-10-CM

## 2020-05-18 RX ORDER — BUSPIRONE HYDROCHLORIDE 15 MG/1
TABLET ORAL
Qty: 30 TABLET | Refills: 2 | Status: SHIPPED | OUTPATIENT
Start: 2020-05-18 | End: 2021-07-17

## 2020-06-02 ENCOUNTER — LAB VISIT (OUTPATIENT)
Dept: LAB | Facility: HOSPITAL | Age: 50
End: 2020-06-02
Attending: INTERNAL MEDICINE
Payer: COMMERCIAL

## 2020-06-02 DIAGNOSIS — I25.10 CORONARY ARTERY DISEASE DUE TO LIPID RICH PLAQUE: ICD-10-CM

## 2020-06-02 DIAGNOSIS — I25.83 CORONARY ARTERY DISEASE DUE TO LIPID RICH PLAQUE: ICD-10-CM

## 2020-06-02 DIAGNOSIS — R74.01 TRANSAMINITIS: ICD-10-CM

## 2020-06-02 DIAGNOSIS — E78.49 OTHER HYPERLIPIDEMIA: ICD-10-CM

## 2020-06-02 PROCEDURE — 80053 COMPREHEN METABOLIC PANEL: CPT

## 2020-06-02 PROCEDURE — 36415 COLL VENOUS BLD VENIPUNCTURE: CPT | Mod: PN

## 2020-06-02 PROCEDURE — 80061 LIPID PANEL: CPT

## 2020-06-03 LAB
ALBUMIN SERPL BCP-MCNC: 3.7 G/DL (ref 3.5–5.2)
ALP SERPL-CCNC: 59 U/L (ref 55–135)
ALT SERPL W/O P-5'-P-CCNC: 33 U/L (ref 10–44)
ANION GAP SERPL CALC-SCNC: 5 MMOL/L (ref 8–16)
AST SERPL-CCNC: 30 U/L (ref 10–40)
BILIRUB SERPL-MCNC: 0.6 MG/DL (ref 0.1–1)
BUN SERPL-MCNC: 18 MG/DL (ref 6–20)
CALCIUM SERPL-MCNC: 8.9 MG/DL (ref 8.7–10.5)
CHLORIDE SERPL-SCNC: 108 MMOL/L (ref 95–110)
CHOLEST SERPL-MCNC: 112 MG/DL (ref 120–199)
CHOLEST/HDLC SERPL: 2.6 {RATIO} (ref 2–5)
CO2 SERPL-SCNC: 26 MMOL/L (ref 23–29)
CREAT SERPL-MCNC: 1.1 MG/DL (ref 0.5–1.4)
EST. GFR  (AFRICAN AMERICAN): >60 ML/MIN/1.73 M^2
EST. GFR  (NON AFRICAN AMERICAN): >60 ML/MIN/1.73 M^2
GLUCOSE SERPL-MCNC: 97 MG/DL (ref 70–110)
HDLC SERPL-MCNC: 43 MG/DL (ref 40–75)
HDLC SERPL: 38.4 % (ref 20–50)
LDLC SERPL CALC-MCNC: 49 MG/DL (ref 63–159)
NONHDLC SERPL-MCNC: 69 MG/DL
POTASSIUM SERPL-SCNC: 4.9 MMOL/L (ref 3.5–5.1)
PROT SERPL-MCNC: 6.7 G/DL (ref 6–8.4)
SODIUM SERPL-SCNC: 139 MMOL/L (ref 136–145)
TRIGL SERPL-MCNC: 100 MG/DL (ref 30–150)

## 2020-06-12 ENCOUNTER — OFFICE VISIT (OUTPATIENT)
Dept: FAMILY MEDICINE | Facility: CLINIC | Age: 50
End: 2020-06-12
Payer: COMMERCIAL

## 2020-06-12 VITALS — HEIGHT: 74 IN | BODY MASS INDEX: 27.98 KG/M2 | WEIGHT: 218 LBS

## 2020-06-12 DIAGNOSIS — F41.1 GAD (GENERALIZED ANXIETY DISORDER): ICD-10-CM

## 2020-06-12 DIAGNOSIS — E78.49 OTHER HYPERLIPIDEMIA: ICD-10-CM

## 2020-06-12 DIAGNOSIS — I25.10 CORONARY ARTERY DISEASE INVOLVING NATIVE CORONARY ARTERY OF NATIVE HEART WITHOUT ANGINA PECTORIS: ICD-10-CM

## 2020-06-12 DIAGNOSIS — I25.83 CORONARY ARTERY DISEASE DUE TO LIPID RICH PLAQUE: ICD-10-CM

## 2020-06-12 DIAGNOSIS — I25.10 CORONARY ARTERY DISEASE DUE TO LIPID RICH PLAQUE: ICD-10-CM

## 2020-06-12 DIAGNOSIS — Z12.5 PROSTATE CANCER SCREENING: ICD-10-CM

## 2020-06-12 DIAGNOSIS — I10 ESSENTIAL HYPERTENSION: Primary | ICD-10-CM

## 2020-06-12 DIAGNOSIS — Z82.49 FAMILY HISTORY OF CORONARY ARTERY DISEASE: ICD-10-CM

## 2020-06-12 DIAGNOSIS — E78.5 DYSLIPIDEMIA (HIGH LDL; LOW HDL): ICD-10-CM

## 2020-06-12 DIAGNOSIS — E66.3 OVERWEIGHT (BMI 25.0-29.9): ICD-10-CM

## 2020-06-12 DIAGNOSIS — M54.50 LOW BACK PAIN, UNSPECIFIED BACK PAIN LATERALITY, UNSPECIFIED CHRONICITY, UNSPECIFIED WHETHER SCIATICA PRESENT: ICD-10-CM

## 2020-06-12 PROCEDURE — 99214 PR OFFICE/OUTPT VISIT, EST, LEVL IV, 30-39 MIN: ICD-10-PCS | Mod: 95,,, | Performed by: INTERNAL MEDICINE

## 2020-06-12 PROCEDURE — 3008F PR BODY MASS INDEX (BMI) DOCUMENTED: ICD-10-PCS | Mod: CPTII,,, | Performed by: INTERNAL MEDICINE

## 2020-06-12 PROCEDURE — 3008F BODY MASS INDEX DOCD: CPT | Mod: CPTII,,, | Performed by: INTERNAL MEDICINE

## 2020-06-12 PROCEDURE — 99214 OFFICE O/P EST MOD 30 MIN: CPT | Mod: 95,,, | Performed by: INTERNAL MEDICINE

## 2020-06-12 NOTE — PROGRESS NOTES
The patient location is: [place]; [place]  The chief complaint leading to consultation is:  Reassessment and go over labs    Visit type: audiovisual    Face to Face time with patient:  125-147 p.m.  42  minutes of total time spent on the encounter, which includes face to face time and non-face to face time preparing to see the patient (eg, review of tests), Obtaining and/or reviewing separately obtained history, Documenting clinical information in the electronic or other health record, Independently interpreting results (not separately reported) and communicating results to the patient/family/caregiver, or Care coordination (not separately reported).     Each patient to whom he or she provides medical services by telemedicine is:  (1) informed of the relationship between the physician and patient and the respective role of any other health care provider with respect to management of the patient; and (2) notified that he or she may decline to receive medical services by telemedicine and may withdraw from such care at any time.    Notes:  Please see below.     Patient ID: Steven Stephen is a 50 y.o. male.    Chief Complaint:  Reassessment and go over labs.    HPI:  Essential hypertension:  Blood pressure at home has been averaging in the 120s over 70s; patient watches his salt intake.     Generalized anxiety disorder:  Handling his moved fine; BuSpar helps when used.  Limit caffeine intake.     Other hyperlipidemia/dyslipidemia:  Lipid panel with cholesterol 112/triglyceride 100/HDL 43/LDL 49.  Goal LDL less than 60.  Tolerates Zetia and rosuvastatin fine; feels better with lower dose; lower back pain cleared..     Coronary artery disease; status post stent 1/19.  Dr. Hill is the cardiologist; no complaints of chest pain, shortness of breath, or palpitations.      Overweight:  Weight is 218 lb at 6 ft 2 in gives a BMI 27.99; goal is for regular exercise 4 to 5 times a week minimum 25-30 minutes along with caloric  "restriction help his weight come down.  Has taken off 15 lb since 12/11/2019.        Objective:      Vitals:    06/12/20 1346   Weight: 98.9 kg (218 lb)   Height: 6' 2" (1.88 m)     Body mass index is 27.99 kg/m².  Wt Readings from Last 3 Encounters:   06/12/20 98.9 kg (218 lb)   12/11/19 105.7 kg (233 lb 0.4 oz)   10/28/19 103.4 kg (227 lb 13.5 oz)        Physical Exam  Constitutional:       General: He is not in acute distress.     Appearance: He is well-developed. He is not diaphoretic.      Comments: Alert responsive in no apparent distress answers questions appropriately and with good thought content.   HENT:      Head: Normocephalic and atraumatic.      Nose: Nose normal.   Pulmonary:      Effort: Pulmonary effort is normal. No respiratory distress.      Comments: Breathing comfortably with no signs of any respiratory distress.  Musculoskeletal: Normal range of motion.   Neurological:      Mental Status: He is alert and oriented to person, place, and time.   Psychiatric:         Mood and Affect: Mood normal.         Behavior: Behavior normal.         Thought Content: Thought content normal.         Judgment: Judgment normal.         Assessment:       1. Essential hypertension    2. [place] (generalized anxiety disorder)    3. Other hyperlipidemia    4. Dyslipidemia (high LDL; low HDL)    5. Low back pain, unspecified back pain laterality, unspecified chronicity, unspecified whether sciatica present    6. Overweight (BMI 25.0-29.9)    7. Coronary artery disease involving native coronary artery of native heart without angina pectoris    8. Coronary artery disease, S/P stent 1/19    9. Family history of coronary artery disease    10. Prostate cancer screening        Plan:       Essential hypertension: Maintain < 2 Gm Na a day diet, and monitor BP at home; keep a log.  -     CBC auto differential; Future; Expected date: 06/19/2020  -     Comprehensive metabolic panel; Future; Expected date: 06/19/2020  -     Lipid " Panel; Future; Expected date: 06/19/2020  -     Magnesium; Future; Expected date: 06/19/2020  -     TSH; Future; Expected date: 06/19/2020  -     Urinalysis; Future; Expected date: 06/19/2020    CLEVELAND (generalized anxiety disorder): Limit caffeine intake with stress reduction and regular exercise as tolerated.  -     TSH; Future; Expected date: 06/19/2020    Other hyperlipidemia: Maintain low fat high fiber diet, exercise regularly. Weight reduction where indicated.  Continue rosuvastatin at reduced dose and continue Zetia; lower back pain has cleared; feels better with with lower dose.  -     Comprehensive metabolic panel; Future; Expected date: 06/19/2020  -     Lipid Panel; Future; Expected date: 06/19/2020  -     TSH; Future; Expected date: 06/19/2020    Dyslipidemia (high LDL; low HDL): Maintain low fat high fiber diet, exercise regularly. Weight reduction where indicated.  Continue Zetia and rosuvastatin.  -     Comprehensive metabolic panel; Future; Expected date: 06/19/2020  -     Lipid Panel; Future; Expected date: 06/19/2020  -     TSH; Future; Expected date: 06/19/2020    Lower back pain:  Cleared with lesser dose of rosuvastatin; likely statin induced.    Overweight:  BMI 27.99; Caloric restriction w regular exercise and weight reduction.    Coronary artery disease involving native coronary artery of native heart without angina pectoris:  Has been stable; cardiologist is Dr. Hackett.  -     CBC auto differential; Future; Expected date: 06/19/2020  -     Comprehensive metabolic panel; Future; Expected date: 06/19/2020  -     Lipid Panel; Future; Expected date: 06/19/2020  -     Magnesium; Future; Expected date: 06/19/2020  -     TSH; Future; Expected date: 06/19/2020    Coronary artery disease, S/P stent 1/19; has been stable; cardiologist is Dr. Hackett.    Family history of coronary artery disease    Prostate cancer screening  -     PSA, Screening; Future; Expected date: 06/19/2020

## 2020-06-12 NOTE — Clinical Note
Please havePatient schedule follow-up appointment to see me in 6 months with labs 1 week prior after an overnight fast.  These have been ordered and can be scheduled.  Please remind him his after visit summary is available for viewing in his portal.

## 2020-06-20 NOTE — PATIENT INSTRUCTIONS
Essential hypertension: Maintain < 2 Gm Na a day diet, and monitor BP at home; keep a log.  -     CBC auto differential; Future; Expected date: 06/19/2020  -     Comprehensive metabolic panel; Future; Expected date: 06/19/2020  -     Lipid Panel; Future; Expected date: 06/19/2020  -     Magnesium; Future; Expected date: 06/19/2020  -     TSH; Future; Expected date: 06/19/2020  -     Urinalysis; Future; Expected date: 06/19/2020    C[place]AND (generalized anxiety disorder): Limit caffeine intake with stress reduction and regular exercise as tolerated.  -     TSH; Future; Expected date: 06/19/2020    Other hyperlipidemia: Maintain low fat high fiber diet, exercise regularly. Weight reduction where indicated.  Continue rosuvastatin at reduced dose and continue Zetia; lower back pain has cleared; feels better with with lower dose.  -     Comprehensive metabolic panel; Future; Expected date: 06/19/2020  -     Lipid Panel; Future; Expected date: 06/19/2020  -     TSH; Future; Expected date: 06/19/2020    Dyslipidemia (high LDL; low HDL): Maintain low fat high fiber diet, exercise regularly. Weight reduction where indicated.  Continue Zetia and rosuvastatin.  -     Comprehensive metabolic panel; Future; Expected date: 06/19/2020  -     Lipid Panel; Future; Expected date: 06/19/2020  -     TSH; Future; Expected date: 06/19/2020    Lower back pain:  Cleared with lesser dose of rosuvastatin; likely statin induced.    Overweight:  BMI 27.99; Caloric restriction w regular exercise and weight reduction.    Coronary artery disease involving native coronary artery of native heart without angina pectoris:  Has been stable; cardiologist is Dr. Hackett.  -     CBC auto differential; Future; Expected date: 06/19/2020  -     Comprehensive metabolic panel; Future; Expected date: 06/19/2020  -     Lipid Panel; Future; Expected date: 06/19/2020  -     Magnesium; Future; Expected date: 06/19/2020  -     TSH; Future; Expected date:  06/19/2020    Coronary artery disease, S/P stent 1/19    Family history of coronary artery disease    Prostate cancer screening  -     PSA, Screening; Future; Expected date: 06/19/2020

## 2020-08-20 DIAGNOSIS — Z12.11 COLON CANCER SCREENING: ICD-10-CM

## 2020-09-02 DIAGNOSIS — I10 ESSENTIAL HYPERTENSION: ICD-10-CM

## 2020-09-06 RX ORDER — AMLODIPINE BESYLATE 5 MG/1
5 TABLET ORAL DAILY
Qty: 90 TABLET | Refills: 3 | Status: SHIPPED | OUTPATIENT
Start: 2020-09-06 | End: 2021-09-12

## 2020-10-05 ENCOUNTER — PATIENT MESSAGE (OUTPATIENT)
Dept: ADMINISTRATIVE | Facility: HOSPITAL | Age: 50
End: 2020-10-05

## 2020-10-23 DIAGNOSIS — I25.10 CORONARY ARTERY DISEASE DUE TO LIPID RICH PLAQUE: ICD-10-CM

## 2020-10-23 DIAGNOSIS — I25.83 CORONARY ARTERY DISEASE DUE TO LIPID RICH PLAQUE: ICD-10-CM

## 2020-10-23 DIAGNOSIS — E78.49 OTHER HYPERLIPIDEMIA: ICD-10-CM

## 2020-10-24 RX ORDER — ROSUVASTATIN CALCIUM 20 MG/1
TABLET, COATED ORAL
Qty: 90 TABLET | Refills: 3 | Status: SHIPPED | OUTPATIENT
Start: 2020-10-24 | End: 2021-11-12

## 2020-12-22 DIAGNOSIS — R74.01 TRANSAMINITIS: ICD-10-CM

## 2020-12-22 DIAGNOSIS — I25.10 CORONARY ARTERY DISEASE DUE TO LIPID RICH PLAQUE: ICD-10-CM

## 2020-12-22 DIAGNOSIS — I25.83 CORONARY ARTERY DISEASE DUE TO LIPID RICH PLAQUE: ICD-10-CM

## 2020-12-22 DIAGNOSIS — E78.49 OTHER HYPERLIPIDEMIA: ICD-10-CM

## 2020-12-22 DIAGNOSIS — I10 ESSENTIAL HYPERTENSION: ICD-10-CM

## 2020-12-23 RX ORDER — EZETIMIBE 10 MG/1
TABLET ORAL
Qty: 45 TABLET | Refills: 0 | Status: SHIPPED | OUTPATIENT
Start: 2020-12-23 | End: 2021-03-30

## 2020-12-23 RX ORDER — METOPROLOL SUCCINATE 25 MG/1
TABLET, EXTENDED RELEASE ORAL
Qty: 90 TABLET | Refills: 0 | Status: SHIPPED | OUTPATIENT
Start: 2020-12-23 | End: 2021-03-30

## 2020-12-24 ENCOUNTER — PATIENT MESSAGE (OUTPATIENT)
Dept: FAMILY MEDICINE | Facility: CLINIC | Age: 50
End: 2020-12-24

## 2020-12-28 DIAGNOSIS — I10 ESSENTIAL HYPERTENSION: ICD-10-CM

## 2020-12-28 DIAGNOSIS — I25.10 CORONARY ARTERY DISEASE DUE TO LIPID RICH PLAQUE: ICD-10-CM

## 2020-12-28 DIAGNOSIS — I25.83 CORONARY ARTERY DISEASE DUE TO LIPID RICH PLAQUE: ICD-10-CM

## 2020-12-30 RX ORDER — TELMISARTAN 40 MG/1
TABLET ORAL
Qty: 90 TABLET | Refills: 0 | Status: SHIPPED | OUTPATIENT
Start: 2020-12-30 | End: 2021-03-30

## 2021-01-04 ENCOUNTER — PATIENT MESSAGE (OUTPATIENT)
Dept: ADMINISTRATIVE | Facility: HOSPITAL | Age: 51
End: 2021-01-04

## 2021-01-05 ENCOUNTER — TELEPHONE (OUTPATIENT)
Dept: FAMILY MEDICINE | Facility: CLINIC | Age: 51
End: 2021-01-05

## 2021-01-06 ENCOUNTER — PATIENT MESSAGE (OUTPATIENT)
Dept: FAMILY MEDICINE | Facility: CLINIC | Age: 51
End: 2021-01-06

## 2021-01-07 ENCOUNTER — TELEPHONE (OUTPATIENT)
Dept: FAMILY MEDICINE | Facility: CLINIC | Age: 51
End: 2021-01-07

## 2021-01-12 LAB
ALBUMIN SERPL-MCNC: 4.2 G/DL (ref 3.8–4.9)
ALBUMIN/GLOB SERPL: 1.5 {RATIO} (ref 1.2–2.2)
ALP SERPL-CCNC: 73 IU/L (ref 39–117)
ALT SERPL-CCNC: 31 IU/L (ref 0–44)
APPEARANCE UR: CLEAR
AST SERPL-CCNC: 29 IU/L (ref 0–40)
BASOPHILS # BLD AUTO: 0.1 X10E3/UL (ref 0–0.2)
BASOPHILS NFR BLD AUTO: 1 %
BILIRUB SERPL-MCNC: 0.5 MG/DL (ref 0–1.2)
BILIRUB UR QL STRIP: NEGATIVE
BUN SERPL-MCNC: 14 MG/DL (ref 6–24)
BUN/CREAT SERPL: 14 (ref 9–20)
CALCIUM SERPL-MCNC: 9.4 MG/DL (ref 8.7–10.2)
CHLORIDE SERPL-SCNC: 106 MMOL/L (ref 96–106)
CHOLEST SERPL-MCNC: 119 MG/DL (ref 100–199)
CO2 SERPL-SCNC: 25 MMOL/L (ref 20–29)
COLOR UR: YELLOW
CREAT SERPL-MCNC: 1.03 MG/DL (ref 0.76–1.27)
EOSINOPHIL # BLD AUTO: 0.2 X10E3/UL (ref 0–0.4)
EOSINOPHIL NFR BLD AUTO: 4 %
ERYTHROCYTE [DISTWIDTH] IN BLOOD BY AUTOMATED COUNT: 12.6 % (ref 11.6–15.4)
GLOBULIN SER CALC-MCNC: 2.8 G/DL (ref 1.5–4.5)
GLUCOSE SERPL-MCNC: 94 MG/DL (ref 65–99)
GLUCOSE UR QL: NEGATIVE
HCT VFR BLD AUTO: 46.7 % (ref 37.5–51)
HDLC SERPL-MCNC: 42 MG/DL
HGB BLD-MCNC: 16 G/DL (ref 13–17.7)
HGB UR QL STRIP: NEGATIVE
IMM GRANULOCYTES # BLD AUTO: 0 X10E3/UL (ref 0–0.1)
IMM GRANULOCYTES NFR BLD AUTO: 0 %
KETONES UR QL STRIP: NEGATIVE
LDLC SERPL CALC-MCNC: 62 MG/DL (ref 0–99)
LEUKOCYTE ESTERASE UR QL STRIP: NEGATIVE
LYMPHOCYTES # BLD AUTO: 2 X10E3/UL (ref 0.7–3.1)
LYMPHOCYTES NFR BLD AUTO: 36 %
MAGNESIUM SERPL-MCNC: 2.1 MG/DL (ref 1.6–2.3)
MCH RBC QN AUTO: 31 PG (ref 26.6–33)
MCHC RBC AUTO-ENTMCNC: 34.3 G/DL (ref 31.5–35.7)
MCV RBC AUTO: 91 FL (ref 79–97)
MICRO URNS: NORMAL
MONOCYTES # BLD AUTO: 0.6 X10E3/UL (ref 0.1–0.9)
MONOCYTES NFR BLD AUTO: 10 %
NEUTROPHILS # BLD AUTO: 2.7 X10E3/UL (ref 1.4–7)
NEUTROPHILS NFR BLD AUTO: 49 %
NITRITE UR QL STRIP: NEGATIVE
PH UR STRIP: 5.5 [PH] (ref 5–7.5)
PLATELET # BLD AUTO: 181 X10E3/UL (ref 150–450)
POTASSIUM SERPL-SCNC: 4.8 MMOL/L (ref 3.5–5.2)
PROT SERPL-MCNC: 7 G/DL (ref 6–8.5)
PROT UR QL STRIP: NEGATIVE
PSA SERPL-MCNC: 1.2 NG/ML (ref 0–4)
RBC # BLD AUTO: 5.16 X10E6/UL (ref 4.14–5.8)
SODIUM SERPL-SCNC: 142 MMOL/L (ref 134–144)
SP GR UR: 1.03 (ref 1–1.03)
TRIGL SERPL-MCNC: 74 MG/DL (ref 0–149)
TSH SERPL DL<=0.005 MIU/L-ACNC: 1.71 UIU/ML (ref 0.45–4.5)
UROBILINOGEN UR STRIP-MCNC: 0.2 MG/DL (ref 0.2–1)
VLDLC SERPL CALC-MCNC: 15 MG/DL (ref 5–40)
WBC # BLD AUTO: 5.5 X10E3/UL (ref 3.4–10.8)

## 2021-01-15 ENCOUNTER — OFFICE VISIT (OUTPATIENT)
Dept: FAMILY MEDICINE | Facility: CLINIC | Age: 51
End: 2021-01-15
Payer: COMMERCIAL

## 2021-01-15 DIAGNOSIS — F41.1 GAD (GENERALIZED ANXIETY DISORDER): ICD-10-CM

## 2021-01-15 DIAGNOSIS — I25.83 CORONARY ARTERY DISEASE DUE TO LIPID RICH PLAQUE: ICD-10-CM

## 2021-01-15 DIAGNOSIS — E78.49 OTHER HYPERLIPIDEMIA: ICD-10-CM

## 2021-01-15 DIAGNOSIS — I25.10 CORONARY ARTERY DISEASE DUE TO LIPID RICH PLAQUE: ICD-10-CM

## 2021-01-15 DIAGNOSIS — I10 ESSENTIAL HYPERTENSION: Primary | ICD-10-CM

## 2021-01-15 DIAGNOSIS — Z82.49 FAMILY HISTORY OF CORONARY ARTERY DISEASE: ICD-10-CM

## 2021-01-15 DIAGNOSIS — E78.5 DYSLIPIDEMIA (HIGH LDL; LOW HDL): ICD-10-CM

## 2021-01-15 DIAGNOSIS — J30.2 SEASONAL ALLERGIES: ICD-10-CM

## 2021-01-15 DIAGNOSIS — Z01.89 ENCOUNTER FOR LABORATORY TEST: ICD-10-CM

## 2021-01-15 PROCEDURE — 99214 OFFICE O/P EST MOD 30 MIN: CPT | Mod: 95,,, | Performed by: INTERNAL MEDICINE

## 2021-01-15 PROCEDURE — 99214 PR OFFICE/OUTPT VISIT, EST, LEVL IV, 30-39 MIN: ICD-10-PCS | Mod: 95,,, | Performed by: INTERNAL MEDICINE

## 2021-01-19 ENCOUNTER — TELEPHONE (OUTPATIENT)
Dept: FAMILY MEDICINE | Facility: CLINIC | Age: 51
End: 2021-01-19

## 2021-01-30 VITALS — DIASTOLIC BLOOD PRESSURE: 80 MMHG | SYSTOLIC BLOOD PRESSURE: 125 MMHG

## 2021-02-03 ENCOUNTER — TELEPHONE (OUTPATIENT)
Dept: FAMILY MEDICINE | Facility: CLINIC | Age: 51
End: 2021-02-03

## 2021-02-04 ENCOUNTER — TELEPHONE (OUTPATIENT)
Dept: FAMILY MEDICINE | Facility: CLINIC | Age: 51
End: 2021-02-04

## 2021-03-27 DIAGNOSIS — I25.10 CORONARY ARTERY DISEASE DUE TO LIPID RICH PLAQUE: ICD-10-CM

## 2021-03-27 DIAGNOSIS — E78.49 OTHER HYPERLIPIDEMIA: ICD-10-CM

## 2021-03-27 DIAGNOSIS — I10 ESSENTIAL HYPERTENSION: ICD-10-CM

## 2021-03-27 DIAGNOSIS — R74.01 TRANSAMINITIS: ICD-10-CM

## 2021-03-27 DIAGNOSIS — I25.83 CORONARY ARTERY DISEASE DUE TO LIPID RICH PLAQUE: ICD-10-CM

## 2021-03-30 RX ORDER — METOPROLOL SUCCINATE 25 MG/1
TABLET, EXTENDED RELEASE ORAL
Qty: 90 TABLET | Refills: 1 | Status: SHIPPED | OUTPATIENT
Start: 2021-03-30 | End: 2021-11-03

## 2021-03-30 RX ORDER — EZETIMIBE 10 MG/1
TABLET ORAL
Qty: 45 TABLET | Refills: 1 | Status: SHIPPED | OUTPATIENT
Start: 2021-03-30 | End: 2021-10-07

## 2021-03-30 RX ORDER — TELMISARTAN 40 MG/1
TABLET ORAL
Qty: 90 TABLET | Refills: 1 | Status: SHIPPED | OUTPATIENT
Start: 2021-03-30 | End: 2021-12-09

## 2021-05-03 ENCOUNTER — TELEPHONE (OUTPATIENT)
Dept: FAMILY MEDICINE | Facility: CLINIC | Age: 51
End: 2021-05-03

## 2021-05-14 LAB
CHOLEST SERPL-MCNC: 110 MG/DL (ref 100–199)
HDLC SERPL-MCNC: 43 MG/DL
LDLC SERPL CALC-MCNC: 49 MG/DL (ref 0–99)
TRIGL SERPL-MCNC: 94 MG/DL (ref 0–149)
VLDLC SERPL CALC-MCNC: 18 MG/DL (ref 5–40)

## 2021-05-19 ENCOUNTER — TELEPHONE (OUTPATIENT)
Dept: FAMILY MEDICINE | Facility: CLINIC | Age: 51
End: 2021-05-19

## 2021-05-20 ENCOUNTER — TELEPHONE (OUTPATIENT)
Dept: FAMILY MEDICINE | Facility: CLINIC | Age: 51
End: 2021-05-20

## 2021-05-24 NOTE — TELEPHONE ENCOUNTER
----- Message from Melinda Sosa sent at 3/18/2020  5:27 PM CDT -----  Contact: Pt   Type:  Patient Returning Call    Who Called: Patient  Who Left Message for Patient: Lubna Rene   Does the patient know what this is regarding?: blood levels   Would the patient rather a call back or a response via [facility]? Call back   Best Call Back Number: [phone]   Additional Information: n/a   
LM for pt to call back  
Lab results discussed with patient by phone.  New liver function test mildly elevated with an AST of 47 and ALT of 54  after having increased the rosuvastatin from 20 to 40 mg a day for improved LDL levels.  Patient has been off alcohol and anyNSAID agents for at least 3 months now.  Due to history of prior liver enzymes.  Patient with a history of coronary stent present.  Problems with atorvastatin having raised his liver enzymes about 1 year ago, so was discontinued; next statin tried was rosuvastatin.  After discussion with patient will drop the rosuvastatin from 40 mg  back to 20 mg per day and add Zetia half of a 10 mg tablet daily.  On rosuvastatin 40 mg per day LDL was therapeutic at 53.  Will recheck his labs in about 6 weeks after an overnight 8 hr fast.  Schedule follow-up appointment in 8 weeks; cancel his 3/27 virtual visit.  Patient is presently living in [place] if his 8 week follow-up is too hard because he still out of town please schedule that as a virtual visit  
Please try to schedule a virtual clinic with the patient go over his lab results in more detail and discuss plan of care.  Please notify patient that his Liver enzymes are mildly elevated.  Recommend avoiding any alcohol and NSA ID agents like Aleve/Naprosyn or ibuprofen/Advil/Motrin.  He can use Tylenol arthritis for pain.  Remind him to also push fluids during the day.  Will discuss his labs further on follow-up including the workup on either virtual visit or in person here in the office; but on a virtual visit with everything going on he would not to go out to come into the clinic for discussion  
Spoke with pt, he states he has not had alcohol, NSAIDS in 3 months bc of his previously elevated liver enzymes. He would like to stop the statin and find an alternative. Virtual visit scheduled for 3/27  
0

## 2021-05-27 ENCOUNTER — OFFICE VISIT (OUTPATIENT)
Dept: FAMILY MEDICINE | Facility: CLINIC | Age: 51
End: 2021-05-27
Payer: COMMERCIAL

## 2021-05-27 ENCOUNTER — TELEPHONE (OUTPATIENT)
Dept: FAMILY MEDICINE | Facility: CLINIC | Age: 51
End: 2021-05-27

## 2021-05-27 VITALS — DIASTOLIC BLOOD PRESSURE: 72 MMHG | SYSTOLIC BLOOD PRESSURE: 125 MMHG

## 2021-05-27 DIAGNOSIS — F41.1 GAD (GENERALIZED ANXIETY DISORDER): ICD-10-CM

## 2021-05-27 DIAGNOSIS — E78.5 DYSLIPIDEMIA (HIGH LDL; LOW HDL): ICD-10-CM

## 2021-05-27 DIAGNOSIS — E78.49 OTHER HYPERLIPIDEMIA: ICD-10-CM

## 2021-05-27 DIAGNOSIS — Z23 HIGH PRIORITY FOR COVID-19 VIRUS VACCINATION: ICD-10-CM

## 2021-05-27 DIAGNOSIS — Z01.89 ENCOUNTER FOR LABORATORY TEST: ICD-10-CM

## 2021-05-27 DIAGNOSIS — I25.10 CORONARY ARTERY DISEASE DUE TO LIPID RICH PLAQUE: ICD-10-CM

## 2021-05-27 DIAGNOSIS — I10 ESSENTIAL HYPERTENSION: Primary | ICD-10-CM

## 2021-05-27 DIAGNOSIS — I25.83 CORONARY ARTERY DISEASE DUE TO LIPID RICH PLAQUE: ICD-10-CM

## 2021-05-27 PROCEDURE — 3078F DIAST BP <80 MM HG: CPT | Mod: CPTII,,, | Performed by: INTERNAL MEDICINE

## 2021-05-27 PROCEDURE — 3074F PR MOST RECENT SYSTOLIC BLOOD PRESSURE < 130 MM HG: ICD-10-PCS | Mod: CPTII,,, | Performed by: INTERNAL MEDICINE

## 2021-05-27 PROCEDURE — 99213 PR OFFICE/OUTPT VISIT, EST, LEVL III, 20-29 MIN: ICD-10-PCS | Mod: 95,,, | Performed by: INTERNAL MEDICINE

## 2021-05-27 PROCEDURE — 3074F SYST BP LT 130 MM HG: CPT | Mod: CPTII,,, | Performed by: INTERNAL MEDICINE

## 2021-05-27 PROCEDURE — 3078F PR MOST RECENT DIASTOLIC BLOOD PRESSURE < 80 MM HG: ICD-10-PCS | Mod: CPTII,,, | Performed by: INTERNAL MEDICINE

## 2021-05-27 PROCEDURE — 99213 OFFICE O/P EST LOW 20 MIN: CPT | Mod: 95,,, | Performed by: INTERNAL MEDICINE

## 2021-05-28 ENCOUNTER — TELEPHONE (OUTPATIENT)
Dept: FAMILY MEDICINE | Facility: CLINIC | Age: 51
End: 2021-05-28

## 2021-07-07 ENCOUNTER — PATIENT MESSAGE (OUTPATIENT)
Dept: ADMINISTRATIVE | Facility: HOSPITAL | Age: 51
End: 2021-07-07

## 2021-07-16 DIAGNOSIS — F41.8 SITUATIONAL ANXIETY: ICD-10-CM

## 2021-07-17 RX ORDER — BUSPIRONE HYDROCHLORIDE 15 MG/1
TABLET ORAL
Qty: 30 TABLET | Refills: 2 | Status: SHIPPED | OUTPATIENT
Start: 2021-07-17 | End: 2022-10-31

## 2021-08-10 ENCOUNTER — TELEPHONE (OUTPATIENT)
Dept: FAMILY MEDICINE | Facility: CLINIC | Age: 51
End: 2021-08-10

## 2021-08-24 LAB
ALBUMIN SERPL-MCNC: 4.5 G/DL (ref 3.8–4.9)
ALBUMIN/GLOB SERPL: 2 {RATIO} (ref 1.2–2.2)
ALP SERPL-CCNC: 73 IU/L (ref 48–121)
ALT SERPL-CCNC: 34 IU/L (ref 0–44)
AST SERPL-CCNC: 26 IU/L (ref 0–40)
BASOPHILS # BLD AUTO: 0 X10E3/UL (ref 0–0.2)
BASOPHILS NFR BLD AUTO: 1 %
BILIRUB SERPL-MCNC: 0.5 MG/DL (ref 0–1.2)
BUN SERPL-MCNC: 19 MG/DL (ref 6–24)
BUN/CREAT SERPL: 16 (ref 9–20)
CALCIUM SERPL-MCNC: 9.1 MG/DL (ref 8.7–10.2)
CHLORIDE SERPL-SCNC: 102 MMOL/L (ref 96–106)
CHOLEST SERPL-MCNC: 131 MG/DL (ref 100–199)
CO2 SERPL-SCNC: 22 MMOL/L (ref 20–29)
CREAT SERPL-MCNC: 1.17 MG/DL (ref 0.76–1.27)
EOSINOPHIL # BLD AUTO: 0.1 X10E3/UL (ref 0–0.4)
EOSINOPHIL NFR BLD AUTO: 3 %
ERYTHROCYTE [DISTWIDTH] IN BLOOD BY AUTOMATED COUNT: 13 % (ref 11.6–15.4)
GLOBULIN SER CALC-MCNC: 2.3 G/DL (ref 1.5–4.5)
GLUCOSE SERPL-MCNC: 114 MG/DL (ref 65–99)
HCT VFR BLD AUTO: 45.7 % (ref 37.5–51)
HDLC SERPL-MCNC: 46 MG/DL
HGB BLD-MCNC: 15.5 G/DL (ref 13–17.7)
IMM GRANULOCYTES # BLD AUTO: 0 X10E3/UL (ref 0–0.1)
IMM GRANULOCYTES NFR BLD AUTO: 0 %
LDLC SERPL CALC-MCNC: 68 MG/DL (ref 0–99)
LYMPHOCYTES # BLD AUTO: 1.6 X10E3/UL (ref 0.7–3.1)
LYMPHOCYTES NFR BLD AUTO: 31 %
MAGNESIUM SERPL-MCNC: 2 MG/DL (ref 1.6–2.3)
MCH RBC QN AUTO: 30.6 PG (ref 26.6–33)
MCHC RBC AUTO-ENTMCNC: 33.9 G/DL (ref 31.5–35.7)
MCV RBC AUTO: 90 FL (ref 79–97)
MONOCYTES # BLD AUTO: 0.5 X10E3/UL (ref 0.1–0.9)
MONOCYTES NFR BLD AUTO: 9 %
NEUTROPHILS # BLD AUTO: 3 X10E3/UL (ref 1.4–7)
NEUTROPHILS NFR BLD AUTO: 56 %
PLATELET # BLD AUTO: 175 X10E3/UL (ref 150–450)
POTASSIUM SERPL-SCNC: 4.4 MMOL/L (ref 3.5–5.2)
PROT SERPL-MCNC: 6.8 G/DL (ref 6–8.5)
RBC # BLD AUTO: 5.06 X10E6/UL (ref 4.14–5.8)
SODIUM SERPL-SCNC: 139 MMOL/L (ref 134–144)
TRIGL SERPL-MCNC: 86 MG/DL (ref 0–149)
VLDLC SERPL CALC-MCNC: 17 MG/DL (ref 5–40)
WBC # BLD AUTO: 5.2 X10E3/UL (ref 3.4–10.8)

## 2021-09-12 DIAGNOSIS — I10 ESSENTIAL HYPERTENSION: ICD-10-CM

## 2021-09-12 RX ORDER — AMLODIPINE BESYLATE 5 MG/1
TABLET ORAL
Qty: 90 TABLET | Refills: 1 | Status: SHIPPED | OUTPATIENT
Start: 2021-09-12 | End: 2022-05-01

## 2021-09-13 ENCOUNTER — PATIENT MESSAGE (OUTPATIENT)
Dept: FAMILY MEDICINE | Facility: CLINIC | Age: 51
End: 2021-09-13

## 2021-09-28 ENCOUNTER — TELEPHONE (OUTPATIENT)
Dept: FAMILY MEDICINE | Facility: CLINIC | Age: 51
End: 2021-09-28

## 2021-09-28 NOTE — TELEPHONE ENCOUNTER
----- Message from Anna White sent at 9/28/2021  1:15 PM CDT -----  Contact: call pt [phone]  Type:  Test Results    Who Called:pt  Name of Test (Lab/Mammo/Etc):  # test results  Best Call Back Number:  call pt [phone]  Additional Information: test results  // a vv  juany  was  attempted   to  be made , but   was  blocked   due to pt  being in another stated //please call

## 2021-10-07 DIAGNOSIS — R74.01 TRANSAMINITIS: ICD-10-CM

## 2021-10-07 DIAGNOSIS — I25.83 CORONARY ARTERY DISEASE DUE TO LIPID RICH PLAQUE: ICD-10-CM

## 2021-10-07 DIAGNOSIS — E78.49 OTHER HYPERLIPIDEMIA: ICD-10-CM

## 2021-10-07 DIAGNOSIS — I25.10 CORONARY ARTERY DISEASE DUE TO LIPID RICH PLAQUE: ICD-10-CM

## 2021-10-07 RX ORDER — EZETIMIBE 10 MG/1
TABLET ORAL
Qty: 45 TABLET | Refills: 1 | Status: SHIPPED | OUTPATIENT
Start: 2021-10-07 | End: 2022-06-02

## 2021-11-09 DIAGNOSIS — I25.10 CORONARY ARTERY DISEASE DUE TO LIPID RICH PLAQUE: ICD-10-CM

## 2021-11-09 DIAGNOSIS — I25.83 CORONARY ARTERY DISEASE DUE TO LIPID RICH PLAQUE: ICD-10-CM

## 2021-11-09 DIAGNOSIS — E78.49 OTHER HYPERLIPIDEMIA: ICD-10-CM

## 2021-11-12 RX ORDER — ROSUVASTATIN CALCIUM 20 MG/1
TABLET, COATED ORAL
Qty: 90 TABLET | Refills: 3 | Status: SHIPPED | OUTPATIENT
Start: 2021-11-12

## 2021-11-16 ENCOUNTER — OFFICE VISIT (OUTPATIENT)
Dept: FAMILY MEDICINE | Facility: CLINIC | Age: 51
End: 2021-11-16
Payer: COMMERCIAL

## 2021-11-16 DIAGNOSIS — J20.8 ACUTE BACTERIAL BRONCHITIS: ICD-10-CM

## 2021-11-16 DIAGNOSIS — Z01.89 ENCOUNTER FOR LABORATORY TEST: ICD-10-CM

## 2021-11-16 DIAGNOSIS — I10 ESSENTIAL HYPERTENSION: ICD-10-CM

## 2021-11-16 DIAGNOSIS — B96.89 ACUTE BACTERIAL BRONCHITIS: ICD-10-CM

## 2021-11-16 DIAGNOSIS — Z82.49 FAMILY HISTORY OF CORONARY ARTERY DISEASE: ICD-10-CM

## 2021-11-16 DIAGNOSIS — I25.83 CORONARY ARTERY DISEASE DUE TO LIPID RICH PLAQUE: ICD-10-CM

## 2021-11-16 DIAGNOSIS — E78.5 DYSLIPIDEMIA (HIGH LDL; LOW HDL): ICD-10-CM

## 2021-11-16 DIAGNOSIS — E78.49 OTHER HYPERLIPIDEMIA: ICD-10-CM

## 2021-11-16 DIAGNOSIS — U07.1 COVID-19 VIRUS INFECTION: Primary | ICD-10-CM

## 2021-11-16 DIAGNOSIS — J98.01 BRONCHOSPASM: ICD-10-CM

## 2021-11-16 DIAGNOSIS — R05.3 PERSISTENT COUGH: ICD-10-CM

## 2021-11-16 DIAGNOSIS — I25.10 CORONARY ARTERY DISEASE DUE TO LIPID RICH PLAQUE: ICD-10-CM

## 2021-11-16 DIAGNOSIS — R53.83 FATIGUE, UNSPECIFIED TYPE: ICD-10-CM

## 2021-11-16 PROCEDURE — 99214 PR OFFICE/OUTPT VISIT, EST, LEVL IV, 30-39 MIN: ICD-10-PCS | Mod: 95,,, | Performed by: INTERNAL MEDICINE

## 2021-11-16 PROCEDURE — 99214 OFFICE O/P EST MOD 30 MIN: CPT | Mod: 95,,, | Performed by: INTERNAL MEDICINE

## 2021-11-16 PROCEDURE — 4010F PR ACE/ARB THEARPY RXD/TAKEN: ICD-10-PCS | Mod: CPTII,95,, | Performed by: INTERNAL MEDICINE

## 2021-11-16 PROCEDURE — 4010F ACE/ARB THERAPY RXD/TAKEN: CPT | Mod: CPTII,95,, | Performed by: INTERNAL MEDICINE

## 2021-11-16 RX ORDER — ALBUTEROL SULFATE 90 UG/1
AEROSOL, METERED RESPIRATORY (INHALATION)
Qty: 18 G | Refills: 2 | Status: SHIPPED | OUTPATIENT
Start: 2021-11-16

## 2021-11-16 RX ORDER — DOXYCYCLINE HYCLATE 100 MG
100 TABLET ORAL EVERY 12 HOURS
Qty: 14 TABLET | Refills: 0 | Status: SHIPPED | OUTPATIENT
Start: 2021-11-16 | End: 2021-11-23

## 2021-11-16 RX ORDER — BUDESONIDE AND FORMOTEROL FUMARATE DIHYDRATE 80; 4.5 UG/1; UG/1
AEROSOL RESPIRATORY (INHALATION)
Qty: 10.2 G | Refills: 2 | Status: SHIPPED | OUTPATIENT
Start: 2021-11-16 | End: 2022-01-09

## 2021-11-16 RX ORDER — BENZONATATE 100 MG/1
CAPSULE ORAL
Qty: 30 CAPSULE | Refills: 1 | Status: SHIPPED | OUTPATIENT
Start: 2021-11-16

## 2021-11-30 ENCOUNTER — PATIENT MESSAGE (OUTPATIENT)
Dept: FAMILY MEDICINE | Facility: CLINIC | Age: 51
End: 2021-11-30
Payer: COMMERCIAL

## 2021-11-30 ENCOUNTER — TELEPHONE (OUTPATIENT)
Dept: FAMILY MEDICINE | Facility: CLINIC | Age: 51
End: 2021-11-30
Payer: COMMERCIAL

## 2021-12-04 DIAGNOSIS — I25.10 CORONARY ARTERY DISEASE DUE TO LIPID RICH PLAQUE: ICD-10-CM

## 2021-12-04 DIAGNOSIS — I25.83 CORONARY ARTERY DISEASE DUE TO LIPID RICH PLAQUE: ICD-10-CM

## 2021-12-04 DIAGNOSIS — I10 ESSENTIAL HYPERTENSION: ICD-10-CM

## 2021-12-09 RX ORDER — TELMISARTAN 40 MG/1
TABLET ORAL
Qty: 90 TABLET | Refills: 1 | Status: SHIPPED | OUTPATIENT
Start: 2021-12-09 | End: 2022-06-15

## 2021-12-28 ENCOUNTER — OFFICE VISIT (OUTPATIENT)
Dept: FAMILY MEDICINE | Facility: CLINIC | Age: 51
End: 2021-12-28
Payer: COMMERCIAL

## 2021-12-28 DIAGNOSIS — I10 ESSENTIAL HYPERTENSION: Primary | ICD-10-CM

## 2021-12-28 DIAGNOSIS — F41.1 GAD (GENERALIZED ANXIETY DISORDER): ICD-10-CM

## 2021-12-28 DIAGNOSIS — M54.50 ACUTE BILATERAL LOW BACK PAIN WITHOUT SCIATICA: ICD-10-CM

## 2021-12-28 DIAGNOSIS — I25.83 CORONARY ARTERY DISEASE DUE TO LIPID RICH PLAQUE: ICD-10-CM

## 2021-12-28 DIAGNOSIS — Z12.11 COLON CANCER SCREENING: ICD-10-CM

## 2021-12-28 DIAGNOSIS — M62.830 LUMBAR PARASPINAL MUSCLE SPASM: ICD-10-CM

## 2021-12-28 DIAGNOSIS — Z12.5 PROSTATE CANCER SCREENING: ICD-10-CM

## 2021-12-28 DIAGNOSIS — I25.10 CORONARY ARTERY DISEASE DUE TO LIPID RICH PLAQUE: ICD-10-CM

## 2021-12-28 DIAGNOSIS — R73.01 IMPAIRED FASTING GLUCOSE: ICD-10-CM

## 2021-12-28 DIAGNOSIS — E78.5 DYSLIPIDEMIA (HIGH LDL; LOW HDL): ICD-10-CM

## 2021-12-28 DIAGNOSIS — E78.49 OTHER HYPERLIPIDEMIA: ICD-10-CM

## 2021-12-28 DIAGNOSIS — Z01.89 ENCOUNTER FOR LABORATORY TEST: ICD-10-CM

## 2021-12-28 DIAGNOSIS — Z82.49 FAMILY HISTORY OF CORONARY ARTERY DISEASE: ICD-10-CM

## 2021-12-28 PROCEDURE — 3077F PR MOST RECENT SYSTOLIC BLOOD PRESSURE >= 140 MM HG: ICD-10-PCS | Mod: CPTII,S$GLB,, | Performed by: INTERNAL MEDICINE

## 2021-12-28 PROCEDURE — 99214 PR OFFICE/OUTPT VISIT, EST, LEVL IV, 30-39 MIN: ICD-10-PCS | Mod: S$GLB,,, | Performed by: INTERNAL MEDICINE

## 2021-12-28 PROCEDURE — 1159F PR MEDICATION LIST DOCUMENTED IN MEDICAL RECORD: ICD-10-PCS | Mod: CPTII,S$GLB,, | Performed by: INTERNAL MEDICINE

## 2021-12-28 PROCEDURE — 1160F RVW MEDS BY RX/DR IN RCRD: CPT | Mod: CPTII,S$GLB,, | Performed by: INTERNAL MEDICINE

## 2021-12-28 PROCEDURE — 3008F BODY MASS INDEX DOCD: CPT | Mod: CPTII,S$GLB,, | Performed by: INTERNAL MEDICINE

## 2021-12-28 PROCEDURE — 99214 OFFICE O/P EST MOD 30 MIN: CPT | Mod: S$GLB,,, | Performed by: INTERNAL MEDICINE

## 2021-12-28 PROCEDURE — 3079F PR MOST RECENT DIASTOLIC BLOOD PRESSURE 80-89 MM HG: ICD-10-PCS | Mod: CPTII,S$GLB,, | Performed by: INTERNAL MEDICINE

## 2021-12-28 PROCEDURE — 99999 PR PBB SHADOW E&M-EST. PATIENT-LVL III: ICD-10-PCS | Mod: PBBFAC,,, | Performed by: INTERNAL MEDICINE

## 2021-12-28 PROCEDURE — 3077F SYST BP >= 140 MM HG: CPT | Mod: CPTII,S$GLB,, | Performed by: INTERNAL MEDICINE

## 2021-12-28 PROCEDURE — 1159F MED LIST DOCD IN RCRD: CPT | Mod: CPTII,S$GLB,, | Performed by: INTERNAL MEDICINE

## 2021-12-28 PROCEDURE — 3008F PR BODY MASS INDEX (BMI) DOCUMENTED: ICD-10-PCS | Mod: CPTII,S$GLB,, | Performed by: INTERNAL MEDICINE

## 2021-12-28 PROCEDURE — 3079F DIAST BP 80-89 MM HG: CPT | Mod: CPTII,S$GLB,, | Performed by: INTERNAL MEDICINE

## 2021-12-28 PROCEDURE — 99999 PR PBB SHADOW E&M-EST. PATIENT-LVL III: CPT | Mod: PBBFAC,,, | Performed by: INTERNAL MEDICINE

## 2021-12-28 PROCEDURE — 1160F PR REVIEW ALL MEDS BY PRESCRIBER/CLIN PHARMACIST DOCUMENTED: ICD-10-PCS | Mod: CPTII,S$GLB,, | Performed by: INTERNAL MEDICINE

## 2021-12-28 RX ORDER — EZETIMIBE 10 MG/1
10 TABLET ORAL DAILY
Qty: 90 TABLET | Refills: 1 | Status: SHIPPED | OUTPATIENT
Start: 2021-12-28 | End: 2022-06-02 | Stop reason: SDUPTHER

## 2021-12-28 RX ORDER — CYCLOBENZAPRINE HCL 10 MG
10 TABLET ORAL 3 TIMES DAILY PRN
Qty: 42 TABLET | Refills: 2 | Status: SHIPPED | OUTPATIENT
Start: 2021-12-28

## 2021-12-28 RX ORDER — IBUPROFEN 600 MG/1
TABLET ORAL
Qty: 30 TABLET | Refills: 2 | Status: SHIPPED | OUTPATIENT
Start: 2021-12-28

## 2021-12-28 RX ORDER — EZETIMIBE 10 MG/1
10 TABLET ORAL DAILY
COMMUNITY
End: 2021-12-28 | Stop reason: SDUPTHER

## 2021-12-28 NOTE — PROGRESS NOTES
Subjective:       Patient ID: Steven Stephen is a 51 y.o. male.    Chief Complaint: Follow-up    HPI here for reassessment and go over his labs as well.  Essential hypertension: Maintain < 2 Gm Na a day diet, and monitor BP at home; keep a log. Pain control of LBP should help BP.  Blood pressures been averaging 140 over 80-90; today manually is 156/92.  Coronary artery disease, S/P stent 1/19; has been stable; has card in [place] [address] [facility] [place]. Cont rosuvastatin; add Zetia 10 mg daily; lipid profile:  Cholesterol 131/triglyceride 86/HDL 46/LDL 68 LDL goal lower 60s; increase Zetia to 10 mg daily 3 weeks ago.  -     ezetimibe (ZETIA) 10 mg tablet; Take 1 tablet (10 mg total) by mouth once daily.  Dispense: 90 tablet; Refill: 1  CLEVELAND (generalized anxiety disorder); Limit caffeine intake with stress reduction and regular exercise as tolerated.  Other hyperlipidemia; Maintain low fat high fiber diet, exercise regularly. Weight reduction where indicated.  Continue rosuvastatin and add Zetia  -     ezetimibe (ZETIA) 10 mg tablet; Take 1 tablet (10 mg total) by mouth once daily.  Dispense: 90 tablet; Refill: 1  Family history of coronary artery disease  Dyslipidemia (high LDL; low HDL); increase aerobic activity.   Acute bilateral low back pain without sciatica; has flared up recently.   -     cyclobenzaprine (FLEXERIL) 10 MG tablet; Take 1 tablet (10 mg total) by mouth 3 (three) times daily as needed for Muscle spasms (lower back pain).  Dispense: 42 tablet; Refill: 2  -     ibuprofen (ADVIL,MOTRIN) 600 MG tablet; 600 mg po TID as needed for pain  Dispense: 30 tablet; Refill: 2  Encounter for laboratory test; labs reviewed and ordered for f/u.   Lumbar paraspinal muscle spasm; thermal heat applications. No strenuous activity/.  -     cyclobenzaprine (FLEXERIL) 10 MG tablet; Take 1 tablet (10 mg total) by mouth 3 (three) times daily as needed for Muscle spasms (lower back pain).  Dispense: 42 tablet; Refill: 2  -      ibuprofen (ADVIL,MOTRIN) 600 MG tablet; 600 mg po TID as needed for pain  Dispense: 30 tablet; Refill: 2  Impaired fasting glucose; Exercise recommended with weight reduction and low carb diet; we'll follow hemoglobin A1c's with you periodically.  -     Hemoglobin A1C; Future; Expected date: 12/28/2021  Colon cancer screen: iFOBT to be done.      Encounter for lab test:  Labs discussed and reviewed with patient at length in ordered for follow-up.     Total time 2:15 p.m. through 3:12 p.m..  Greater than 50% of time spent in discussion, counseling, and review.  Medications reviewed and addressed; labs also reviewed and discussed with patient at length in ordered for follow-up      The 10-year ASCVD risk score (Supriya WELLINGTON Jr., et al., 2013) is: 4.4%    Values used to calculate the score:      Age: 51 years      Sex: Male      Is Non- : No      Diabetic: No      Tobacco smoker: No      Systolic Blood Pressure: 156 mmHg      Is BP treated: Yes      HDL Cholesterol: 46 mg/dL      Total Cholesterol: 131 mg/dL      Review of Systems   Constitutional: Negative for appetite change and unexpected weight change.   HENT: Negative for congestion, postnasal drip, rhinorrhea and sinus pressure.         Denies seasonal allergies, or perennial allergies   Eyes: Negative for discharge and itching.   Respiratory: Negative for cough, chest tightness, shortness of breath and wheezing.    Cardiovascular: Negative for chest pain, palpitations and leg swelling.   Gastrointestinal: Negative for abdominal pain, blood in stool, constipation, diarrhea, nausea and vomiting.   Endocrine: Negative for polydipsia, polyphagia and polyuria.   Genitourinary: Negative for dysuria and hematuria.   Musculoskeletal: Negative for arthralgias and myalgias.   Skin: Negative for rash.   Allergic/Immunologic: Negative for environmental allergies and food allergies.   Neurological: Negative for tremors, seizures and headaches.  "  Hematological: Negative for adenopathy. Does not bruise/bleed easily.   Psychiatric/Behavioral: Negative for dysphoric mood. The patient is not nervous/anxious.         Denies anxiety or depression.      Objective:        Vitals:    12/28/21 1323   BP: (!) 156/92   Pulse: 83   Resp: 16   Weight: 110.2 kg (243 lb 0.9 oz)   Height: 6' 2" (1.88 m)       BMI Readings from Last 3 Encounters:   12/28/21 31.21 kg/m²   06/12/20 27.99 kg/m²   12/11/19 29.92 kg/m²        Wt Readings from Last 3 Encounters:   12/28/21 1323 110.2 kg (243 lb 0.9 oz)   06/12/20 1346 98.9 kg (218 lb)   12/11/19 0915 105.7 kg (233 lb 0.4 oz)        BP Readings from Last 3 Encounters:   12/28/21 (!) 156/92   05/27/21 125/72   01/15/21 125/80        There are no preventive care reminders to display for this patient.     Health Maintenance Due   Topic Date Due    HIV Screening  Never done    Colorectal Cancer Screening  08/06/2020    COVID-19 Vaccine (3 - Booster for Pfizer series) 07/29/2021         Past Medical History:   Diagnosis Date    ALLERGIC RHINITIS 5/15/2012    Dyslipidemia     CLEVELAND (generalized anxiety disorder)     History of corneal abrasion     both eyes x 4    Hyperlipidemia     Hypertension     Iritis 11/09 and 2011    right eye x 2    Iritis     Migraine     Sinusitis        Past Surgical History:   Procedure Laterality Date    BELPHAROPTOSIS REPAIR      left upper lid    coronary art stent  01/17/2019    Dr Hackett placed; [place]    LEFT HEART CATHETERIZATION Left 1/17/2019    Procedure: Left heart cath;  Surgeon: Miguel A Hackett MD;  [facility];  Service: Cardiology;  Laterality: Left;    TYMPANOSTOMY TUBE PLACEMENT      WISDOM TOOTH EXTRACTION         Social History     Tobacco Use    Smoking status: Never Smoker    Smokeless tobacco: Never Used   Substance Use Topics    Alcohol use: Yes     Comment: RARE    Drug use: No       Family History   Problem Relation Age of Onset    Diabetes Mother     Diabetes " Maternal Grandmother     Cataracts Maternal Grandmother     Heart disease Father 58        stents since 96    Heart disease Sister         dx CAD at 58.       Review of patient's allergies indicates:   Allergen Reactions    Atorvastatin      Markedly elevated CPK but also working out intensely at the time and fall through ceiling with right quad pain    Hydrochlorothiazide      Led to increased BP and pulse rate; aslo atypical chest pain and palpitations.    Penicillins Anaphylaxis    Shellfish containing products      abd cramping/diarrhea; to shellfish    Latex, natural rubber Rash and Blisters       Current Outpatient Medications on File Prior to Visit   Medication Sig Dispense Refill    albuterol (VENTOLIN HFA) 90 mcg/actuation inhaler Rescue; take 2 puffs every 4-6 hours as needed for wheezing/coughing spasm/shortness of breath 18 g 2    amLODIPine (NORVASC) 5 MG tablet TAKE 1 TABLET BY MOUTH EVERY DAY 90 tablet 1    aspirin (ECOTRIN) 81 MG EC tablet Take 81 mg by mouth once daily.      benzonatate (TESSALON) 100 MG capsule 100-200 mg p.o. t.i.d. p.r.n. cough/coughing spasms 30 capsule 1    busPIRone (BUSPAR) 15 MG tablet TAKE 1/3-1/2 TABLET BY MOUTH 3 TIMES A DAY AS NEEDED FOR ANXIETY. 30 tablet 2    clopidogreL (PLAVIX) 75 mg tablet Take 1 tablet (75 mg total) by mouth once daily. 30 tablet 0    loratadine (CLARITIN) 10 mg tablet Take 1 tablet (10 mg total) by mouth once daily. As needed for congestion. 30 tablet 1    metoprolol succinate (TOPROL-XL) 25 MG 24 hr tablet TAKE 1 TABLET BY MOUTH EVERY DAY 90 tablet 0    multivitamin (THERAGRAN) per tablet Take 1 tablet by mouth once daily.      rosuvastatin (CRESTOR) 20 MG tablet TAKE 1 TABLET BY MOUTH EVERY DAY 90 tablet 3    telmisartan (MICARDIS) 40 MG Tab TAKE 1 TABLET BY MOUTH EVERY DAY 90 tablet 1    [DISCONTINUED] ezetimibe (ZETIA) 10 mg tablet Take 10 mg by mouth once daily.      budesonide-formoterol 80-4.5 mcg (SYMBICORT) 80-4.5  mcg/actuation HFAA Controller; 2 puffs twice a day around the clock (Patient not taking: Reported on 12/28/2021) 10.2 g 2    ezetimibe (ZETIA) 10 mg tablet TAKE 1/2 TABLET BY MOUTH DAILY (Patient not taking: Reported on 12/28/2021) 45 tablet 1     No current facility-administered medications on file prior to visit.       Physical Exam  Vitals reviewed.   Constitutional:       Appearance: He is well-developed and well-nourished.   HENT:      Head: Normocephalic and atraumatic.   Eyes:      Extraocular Movements: EOM normal.   Neck:      Thyroid: No thyromegaly.      Vascular: No carotid bruit.   Cardiovascular:      Rate and Rhythm: Normal rate and regular rhythm.      Heart sounds: Normal heart sounds. No murmur heard.  No gallop.    Pulmonary:      Effort: Pulmonary effort is normal. No respiratory distress.      Breath sounds: Normal breath sounds. No wheezing or rales.   Abdominal:      General: Bowel sounds are normal. There is no distension.      Palpations: Abdomen is soft.      Tenderness: There is no abdominal tenderness. There is no guarding or rebound.   Musculoskeletal:         General: No edema. Normal range of motion.      Cervical back: Normal range of motion and neck supple.      Right lower leg: No edema.      Left lower leg: No edema.      Comments: No L-S sp tenderness to palp.    Lymphadenopathy:      Cervical: No cervical adenopathy.   Skin:     Findings: No rash.   Neurological:      Mental Status: He is alert and oriented to person, place, and time.      Comments: Moves all 4 extremities fine.   Psychiatric:         Mood and Affect: Mood and affect normal.         Behavior: Behavior normal.         Thought Content: Thought content normal.         Assessment:       1. Essential hypertension    2. Coronary artery disease, S/P stent 1/19    3. CLEVELAND (generalized anxiety disorder)    4. Other hyperlipidemia    5. Family history of coronary artery disease    6. Dyslipidemia (high LDL; low HDL)    7.  Acute bilateral low back pain without sciatica    8. Encounter for laboratory test    9. Lumbar paraspinal muscle spasm    10. Impaired fasting glucose    11. Prostate cancer screening    12. Colon cancer screening        Plan:       Essential hypertension: Maintain < 2 Gm Na a day diet, and monitor BP at home; keep a log. Pain control of LBP should help BP    Coronary artery disease, S/P stent 1/19; has been stable; has card in [place] [facility] [place]. Cont rosuvastatin  -     ezetimibe (ZETIA) 10 mg tablet; Take 1 tablet (10 mg total) by mouth once daily.  Dispense: 90 tablet; Refill: 1    C[place]AND (generalized anxiety disorder); Limit caffeine intake with stress reduction and regular exercise as tolerated.    Other hyperlipidemia; Maintain low fat high fiber diet, exercise regularly. Weight reduction where indicated.  -     ezetimibe (ZETIA) 10 mg tablet; Take 1 tablet (10 mg total) by mouth once daily.  Dispense: 90 tablet; Refill: 1  -     Comprehensive Metabolic Panel; Future; Expected date: 12/28/2021  -     Lipid Panel; Future; Expected date: 12/28/2021    Family history of coronary artery disease    Dyslipidemia (high LDL; low HDL); increase aerobic activity.   -     Comprehensive Metabolic Panel; Future; Expected date: 12/28/2021  -     Lipid Panel; Future; Expected date: 12/28/2021    Acute bilateral low back pain without sciatica; has flared up recently.   -     cyclobenzaprine (FLEXERIL) 10 MG tablet; Take 1 tablet (10 mg total) by mouth 3 (three) times daily as needed for Muscle spasms (lower back pain).  Dispense: 42 tablet; Refill: 2  -     ibuprofen (ADVIL,MOTRIN) 600 MG tablet; 600 mg po TID as needed for pain  Dispense: 30 tablet; Refill: 2    Encounter for laboratory test; labs reviewed and ordered for f/u.   -     ezetimibe (ZETIA) 10 mg tablet; Take 1 tablet (10 mg total) by mouth once daily.  Dispense: 90 tablet; Refill: 1    Lumbar paraspinal muscle spasm; thermal heat applications. No strenuous  activity/.  -     cyclobenzaprine (FLEXERIL) 10 MG tablet; Take 1 tablet (10 mg total) by mouth 3 (three) times daily as needed for Muscle spasms (lower back pain).  Dispense: 42 tablet; Refill: 2  -     ibuprofen (ADVIL,MOTRIN) 600 MG tablet; 600 mg po TID as needed for pain  Dispense: 30 tablet; Refill: 2    Impaired fasting glucose; Exercise recommended with weight reduction and low carb diet; we'll follow hemoglobin A1c's with you periodically.  -     Hemoglobin A1C; Future; Expected date: 12/28/2021    Colon cancer screen: iFOBT to be done.

## 2021-12-28 NOTE — PATIENT INSTRUCTIONS
Essential hypertension: Maintain < 2 Gm Na a day diet, and monitor BP at home; keep a log. Pain control of LBP should help BP    Coronary artery disease, S/P stent 1/19; has been stable; has card in [place] [facility] [place]. Cont rosuvastatin  -     ezetimibe (ZETIA) 10 mg tablet; Take 1 tablet (10 mg total) by mouth once daily.  Dispense: 90 tablet; Refill: 1    CLEVELAND (generalized anxiety disorder); Limit caffeine intake with stress reduction and regular exercise as tolerated.    Other hyperlipidemia; Maintain low fat high fiber diet, exercise regularly. Weight reduction where indicated.  -     ezetimibe (ZETIA) 10 mg tablet; Take 1 tablet (10 mg total) by mouth once daily.  Dispense: 90 tablet; Refill: 1  -     Comprehensive Metabolic Panel; Future; Expected date: 12/28/2021  -     Lipid Panel; Future; Expected date: 12/28/2021    Family history of coronary artery disease    Dyslipidemia (high LDL; low HDL); increase aerobic activity.   -     Comprehensive Metabolic Panel; Future; Expected date: 12/28/2021  -     Lipid Panel; Future; Expected date: 12/28/2021    Acute bilateral low back pain without sciatica; has flared up recently.   -     cyclobenzaprine (FLEXERIL) 10 MG tablet; Take 1 tablet (10 mg total) by mouth 3 (three) times daily as needed for Muscle spasms (lower back pain).  Dispense: 42 tablet; Refill: 2  -     ibuprofen (ADVIL,MOTRIN) 600 MG tablet; 600 mg po TID as needed for pain  Dispense: 30 tablet; Refill: 2    Encounter for laboratory test; labs reviewed and ordered for f/u.   -     ezetimibe (ZETIA) 10 mg tablet; Take 1 tablet (10 mg total) by mouth once daily.  Dispense: 90 tablet; Refill: 1    Lumbar paraspinal muscle spasm; thermal heat applications. No strenuous activity/.  -     cyclobenzaprine (FLEXERIL) 10 MG tablet; Take 1 tablet (10 mg total) by mouth 3 (three) times daily as needed for Muscle spasms (lower back pain).  Dispense: 42 tablet; Refill: 2  -     ibuprofen (ADVIL,MOTRIN) 600 MG  tablet; 600 mg po TID as needed for pain  Dispense: 30 tablet; Refill: 2    Impaired fasting glucose; Exercise recommended with weight reduction and low carb diet; we'll follow hemoglobin A1c's with you periodically.  -     Hemoglobin A1C; Future; Expected date: 12/28/2021

## 2021-12-29 ENCOUNTER — LAB VISIT (OUTPATIENT)
Dept: LAB | Facility: HOSPITAL | Age: 51
End: 2021-12-29
Payer: COMMERCIAL

## 2021-12-29 DIAGNOSIS — Z12.11 COLON CANCER SCREENING: ICD-10-CM

## 2021-12-29 PROCEDURE — 82274 ASSAY TEST FOR BLOOD FECAL: CPT | Performed by: INTERNAL MEDICINE

## 2021-12-30 LAB — HEMOCCULT STL QL IA: NEGATIVE

## 2022-01-09 VITALS
HEIGHT: 74 IN | BODY MASS INDEX: 31.19 KG/M2 | RESPIRATION RATE: 16 BRPM | SYSTOLIC BLOOD PRESSURE: 140 MMHG | HEART RATE: 83 BPM | WEIGHT: 243.06 LBS | DIASTOLIC BLOOD PRESSURE: 85 MMHG

## 2022-01-27 DIAGNOSIS — I25.10 CORONARY ARTERY DISEASE DUE TO LIPID RICH PLAQUE: ICD-10-CM

## 2022-01-27 DIAGNOSIS — I10 ESSENTIAL HYPERTENSION: ICD-10-CM

## 2022-01-27 DIAGNOSIS — I25.83 CORONARY ARTERY DISEASE DUE TO LIPID RICH PLAQUE: ICD-10-CM

## 2022-01-27 NOTE — TELEPHONE ENCOUNTER
No new care gaps identified.  Powered by [employer] by [employer]. Reference number: [id number].   1/27/2022 1:39:35 AM CST

## 2022-02-01 RX ORDER — METOPROLOL SUCCINATE 25 MG/1
TABLET, EXTENDED RELEASE ORAL
Qty: 90 TABLET | Refills: 1 | Status: SHIPPED | OUTPATIENT
Start: 2022-02-01

## 2022-02-11 ENCOUNTER — PATIENT OUTREACH (OUTPATIENT)
Dept: ADMINISTRATIVE | Facility: HOSPITAL | Age: 52
End: 2022-02-11
Payer: COMMERCIAL

## 2022-02-11 ENCOUNTER — PATIENT MESSAGE (OUTPATIENT)
Dept: ADMINISTRATIVE | Facility: HOSPITAL | Age: 52
End: 2022-02-11
Payer: COMMERCIAL

## 2022-02-11 NOTE — PROGRESS NOTES
Non-compliant report chart audits for HYPERTENSION MANAGEMENT    Outreach to patient in reference to hypertension management    RE:  Patient hypertension management    Portal message sent    Outreach:  Hypertension Management

## 2022-04-29 DIAGNOSIS — I10 ESSENTIAL HYPERTENSION: ICD-10-CM

## 2022-04-29 NOTE — TELEPHONE ENCOUNTER
Refill Routing Note   Medication(s) are not appropriate for processing by [facility] for the following reason(s):      - Required vitals are abnormal    [facility] action(s):  Defer          Medication reconciliation completed: No     Appointments  past 12m or future 3m with PCP    Date Provider   Last Visit   12/28/2021 Deonte Miramontes MD   Next Visit   Visit date not found Deonte Miramontes MD   ED visits in past 90 days: 0        Note composed:8:46 AM 04/29/2022

## 2022-04-29 NOTE — TELEPHONE ENCOUNTER
No new care gaps identified.  Powered by [employer]. Reference number: [id number].   4/29/2022 12:39:00 AM CDT

## 2022-05-01 RX ORDER — AMLODIPINE BESYLATE 5 MG/1
TABLET ORAL
Qty: 90 TABLET | Refills: 1 | Status: SHIPPED | OUTPATIENT
Start: 2022-05-01 | End: 2022-11-03

## 2022-06-02 DIAGNOSIS — R74.01 TRANSAMINITIS: ICD-10-CM

## 2022-06-02 DIAGNOSIS — I25.10 CORONARY ARTERY DISEASE DUE TO LIPID RICH PLAQUE: ICD-10-CM

## 2022-06-02 DIAGNOSIS — I25.83 CORONARY ARTERY DISEASE DUE TO LIPID RICH PLAQUE: ICD-10-CM

## 2022-06-02 DIAGNOSIS — E78.49 OTHER HYPERLIPIDEMIA: ICD-10-CM

## 2022-06-02 RX ORDER — EZETIMIBE 10 MG/1
TABLET ORAL
Qty: 45 TABLET | Refills: 0 | Status: SHIPPED | OUTPATIENT
Start: 2022-06-02

## 2022-06-02 NOTE — TELEPHONE ENCOUNTER
Care Due:                  Date            Visit Type   Department     Provider  --------------------------------------------------------------------------------                                EP -                              PRIMARY      [facility] FAMILY  Last Visit: 12-      CARE ([facility])   MEDICINE       Deonte Miramontes  Next Visit: None Scheduled  None         None Found                                                            Last  Test          Frequency    Reason                     Performed    Due Date  --------------------------------------------------------------------------------    CMP.........  12 months..  ezetimibe, rosuvastatin,   08- 08-                             telmisartan..............    Lipid Panel.  12 months..  ezetimibe, rosuvastatin..  08- 08-    [facility] Embedded Care Gaps. Reference number: [id number]. 6/02/2022   12:38:23 AM CDT

## 2022-06-02 NOTE — TELEPHONE ENCOUNTER
Refill Authorization Note   Steven Stephen  is requesting a refill authorization.  Brief Assessment and Rationale for Refill:  Approve    -Medication-Related Problems Identified: Requires labs  Medication Therapy Plan:       Medication Reconciliation Completed: No   Comments:     Provider Staff:     Action is required for this patient.   Please see care gap opportunities below in Care Due Message.     Thanks!  [facility]     Appointments      Date Provider   Last Visit   12/28/2021 Deonte Miramontes MD   Next Visit   Visit date not found Deonte Miramontes MD     Note composed:4:57 PM 06/02/2022           Note composed:4:56 PM 06/02/2022        2

## 2022-06-07 ENCOUNTER — TELEPHONE (OUTPATIENT)
Dept: FAMILY MEDICINE | Facility: CLINIC | Age: 52
End: 2022-06-07
Payer: COMMERCIAL

## 2022-06-07 NOTE — TELEPHONE ENCOUNTER
----- Message from Mckenzie Mora sent at 6/7/2022  3:47 PM CDT -----  Contact: patient  Type: Needs Medical Advice  Who Called:  patient  Best Call Back Number: [phone]   Additional Information: patient needs labs [place] [place] [address].

## 2022-06-17 ENCOUNTER — PATIENT MESSAGE (OUTPATIENT)
Dept: FAMILY MEDICINE | Facility: CLINIC | Age: 52
End: 2022-06-17
Payer: COMMERCIAL

## 2022-06-28 LAB
PSA SERPL-MCNC: 1.7 NG/ML (ref 0–4)
TSH SERPL DL<=0.005 MIU/L-ACNC: 2.06 UIU/ML (ref 0.45–4.5)

## 2022-07-05 ENCOUNTER — OFFICE VISIT (OUTPATIENT)
Dept: FAMILY MEDICINE | Facility: CLINIC | Age: 52
End: 2022-07-05
Payer: COMMERCIAL

## 2022-07-05 DIAGNOSIS — R73.01 IMPAIRED FASTING GLUCOSE: ICD-10-CM

## 2022-07-05 DIAGNOSIS — J30.2 SEASONAL ALLERGIES: ICD-10-CM

## 2022-07-05 DIAGNOSIS — E78.5 DYSLIPIDEMIA (HIGH LDL; LOW HDL): ICD-10-CM

## 2022-07-05 DIAGNOSIS — Z82.49 FAMILY HISTORY OF CORONARY ARTERY DISEASE: ICD-10-CM

## 2022-07-05 DIAGNOSIS — F41.1 GAD (GENERALIZED ANXIETY DISORDER): ICD-10-CM

## 2022-07-05 DIAGNOSIS — J30.1 ALLERGIC RHINITIS DUE TO POLLEN, UNSPECIFIED SEASONALITY: ICD-10-CM

## 2022-07-05 DIAGNOSIS — I10 ESSENTIAL HYPERTENSION: Primary | ICD-10-CM

## 2022-07-05 DIAGNOSIS — R63.5 WEIGHT GAIN: ICD-10-CM

## 2022-07-05 DIAGNOSIS — Z12.5 PROSTATE CANCER SCREENING: ICD-10-CM

## 2022-07-05 DIAGNOSIS — E66.9 OBESITY WITH SERIOUS COMORBIDITY, UNSPECIFIED CLASSIFICATION, UNSPECIFIED OBESITY TYPE: ICD-10-CM

## 2022-07-05 DIAGNOSIS — E78.2 MIXED HYPERLIPIDEMIA: ICD-10-CM

## 2022-07-05 DIAGNOSIS — Z01.89 ENCOUNTER FOR LABORATORY TEST: ICD-10-CM

## 2022-07-05 DIAGNOSIS — I25.83 CORONARY ARTERY DISEASE DUE TO LIPID RICH PLAQUE: ICD-10-CM

## 2022-07-05 DIAGNOSIS — I25.10 CORONARY ARTERY DISEASE DUE TO LIPID RICH PLAQUE: ICD-10-CM

## 2022-07-05 PROCEDURE — 1160F RVW MEDS BY RX/DR IN RCRD: CPT | Mod: CPTII,95,, | Performed by: INTERNAL MEDICINE

## 2022-07-05 PROCEDURE — 1159F PR MEDICATION LIST DOCUMENTED IN MEDICAL RECORD: ICD-10-PCS | Mod: CPTII,95,, | Performed by: INTERNAL MEDICINE

## 2022-07-05 PROCEDURE — 99214 PR OFFICE/OUTPT VISIT, EST, LEVL IV, 30-39 MIN: ICD-10-PCS | Mod: 95,,, | Performed by: INTERNAL MEDICINE

## 2022-07-05 PROCEDURE — 4010F PR ACE/ARB THEARPY RXD/TAKEN: ICD-10-PCS | Mod: CPTII,95,, | Performed by: INTERNAL MEDICINE

## 2022-07-05 PROCEDURE — 4010F ACE/ARB THERAPY RXD/TAKEN: CPT | Mod: CPTII,95,, | Performed by: INTERNAL MEDICINE

## 2022-07-05 PROCEDURE — 99214 OFFICE O/P EST MOD 30 MIN: CPT | Mod: 95,,, | Performed by: INTERNAL MEDICINE

## 2022-07-05 PROCEDURE — 1159F MED LIST DOCD IN RCRD: CPT | Mod: CPTII,95,, | Performed by: INTERNAL MEDICINE

## 2022-07-05 PROCEDURE — 1160F PR REVIEW ALL MEDS BY PRESCRIBER/CLIN PHARMACIST DOCUMENTED: ICD-10-PCS | Mod: CPTII,95,, | Performed by: INTERNAL MEDICINE

## 2022-07-05 RX ORDER — MOMETASONE FUROATE 50 UG/1
SPRAY, METERED NASAL
Qty: 51 G | Refills: 1 | Status: SHIPPED | OUTPATIENT
Start: 2022-07-05 | End: 2022-09-16

## 2022-07-05 RX ORDER — LORATADINE 10 MG/1
10 TABLET ORAL DAILY PRN
Qty: 90 TABLET | Refills: 1 | Status: SHIPPED | OUTPATIENT
Start: 2022-07-05 | End: 2023-04-07

## 2022-07-05 NOTE — Clinical Note
Please call patient and schedule his follow-up appointment 3 months from his visit with his obtaining labs from [facility] 1 week before hand after an overnight fast.  Labs have been ordered after review.  Remind him his after visit summary is available for viewing a as well in his portal.

## 2022-07-05 NOTE — PROGRESS NOTES
Subjective:      The patient location is: home  The chief complaint leading to consultation is:  Reassessment and go over labs.  Visit type:  Audiovisual [id number]  Face to Face time with patient:  10:26 a.m. through 10:48 a.m.  40 minutes of total time spent on the encounter, which includes face to face time and non-face to face time preparing to see the patient (eg, review of tests), Obtaining and/or reviewing separately obtained history, Documenting clinical information in the electronic or other health record, Independently interpreting results (not separately reported) and communicating results to the patient/family/caregiver, or Care coordination (not separately reported).     Each patient to whom he or she provides medical services by telemedicine is:  (1) informed of the relationship between the physician and patient and the respective role of any other health care provider with respect to management of the patient; and (2) notified that he or she may decline to receive medical services by telemedicine and may withdraw from such care at any time.    Notes:  Please see HPI below     Patient ID: Steven Stephen is a 52 y.o. male.    Chief Complaint:  Here today for reassessment and lab review.  HPI  Essential hypertension: Maintain < 2 Gm Na a day diet, and monitor BP at home; keep a log.  Wait for 5 minutes after being seated in the morning before running the blood pressure cuff.  On telmisartan and metoprolol succinate as well as amlodipine  -     Lipid Panel; Future; Expected date: 07/17/2022  -     Comprehensive Metabolic Panel; Future; Expected date: 07/17/2022  -     CBC Auto Differential; Future; Expected date: 07/17/2022  -     Magnesium; Future; Expected date: 07/17/2022  C[place]AND (generalized anxiety disorder): Limit caffeine intake with stress reduction and regular exercise as tolerated.  Has BuSpar to use as needed.  Mixed hyperlipidemia: Maintain low fat high fiber diet, exercise regularly. Weight  reduction where indicated.  Continue rosuvastatin and Zetia half of a 10 mg tablet daily needs lipid profile updated on follow-up  -     Lipid Panel; Future; Expected date: 07/17/2022  -     Comprehensive Metabolic Panel; Future; Expected date: 07/17/2022  Dyslipidemia (high LDL; low HDL):  As above and increase aerobic activity  -     Lipid Panel; Future; Expected date: 07/17/2022  -     Comprehensive Metabolic Panel; Future; Expected date: 07/17/2022  Coronary artery disease, S/P stent 1/19: CAD has been stable without any chest pain, shortness of breath, or palpitations. Keep follow up with cardiologist as directed.   Family history of coronary artery disease  Seasonal allergies  -     loratadine (CLARITIN) 10 mg tablet; Take 1 tablet (10 mg total) by mouth daily as needed for Allergies. Please provide generic  Dispense: 90 tablet; Refill: 1  -     mometasone (NASONEX) 50 mcg/actuation nasal spray; One spray twice daily as needed for nasal congestion.  Please provide generic  Dispense: 51 g; Refill: 1  -     CBC Auto Differential; Future; Expected date: 07/17/2022  Allergic rhinitis due to pollen, unspecified seasonality  -     loratadine (CLARITIN) 10 mg tablet; Take 1 tablet (10 mg total) by mouth daily as needed for Allergies. Please provide generic  Dispense: 90 tablet; Refill: 1  -     mometasone (NASONEX) 50 mcg/actuation nasal spray; One spray twice daily as needed for nasal congestion.  Please provide generic  Dispense: 51 g; Refill: 1  -     CBC Auto Differential; Future; Expected date: 07/17/2022  Encounter for laboratory test:  Labs reviewed and discussed with patient at length in ordered for follow-up.  Weight gain:  Has gone up in his weight to 240-244, 231 this morning or back down 13 lbs, his goal is 200 lb; will adhere to diet and exercise a lot tighter.  -     Lipid Panel; Future; Expected date: 07/17/2022  -     Comprehensive Metabolic Panel; Future; Expected date: 07/17/2022  Obesity with  serious comorbidity, unspecified classification, unspecified obesity type: Caloric restriction w regular exercise and weight reduction.  -     Lipid Panel; Future; Expected date: 07/17/2022  -     Comprehensive Metabolic Panel; Future; Expected date: 07/17/2022  Impaired fasting glucose: Exercise recommended with weight reduction and low carb diet; we'll follow hemoglobin A1c's with you periodically.  -     Comprehensive Metabolic Panel; Future; Expected date: 07/17/2022  -     Hemoglobin A1C; Future; Expected date: 07/17/2022  Prostate cancer screening:  PSA 1.7; 1 year ago was 1.2.  Will repeat in 12 months.      Review of Systems   Constitutional: Negative for fever and unexpected weight change.   HENT: Negative for congestion, postnasal drip and rhinorrhea.         Seasonal allergies     Respiratory: Negative for cough, chest tightness, shortness of breath and wheezing.    Cardiovascular: Negative for chest pain, palpitations and leg swelling.   Gastrointestinal: Negative for abdominal pain, blood in stool, constipation, diarrhea, nausea and vomiting.   Genitourinary: Negative for dysuria and hematuria.   Musculoskeletal: Negative for neck pain.   Neurological: Negative for headaches.   Psychiatric/Behavioral: Negative for dysphoric mood. The patient is not nervous/anxious.         Uspar helps when needed for anxiety.       Objective:        There were no vitals filed for this visit.    BMI Readings from Last 3 Encounters:   12/28/21 31.21 kg/m²   06/12/20 27.99 kg/m²   12/11/19 29.92 kg/m²        Wt Readings from Last 3 Encounters:   12/28/21 1323 110.2 kg (243 lb 0.9 oz)   06/12/20 1346 98.9 kg (218 lb)   12/11/19 0915 105.7 kg (233 lb 0.4 oz)        BP Readings from Last 3 Encounters:   12/28/21 (!) 140/85   05/27/21 125/72   01/15/21 125/80        There are no preventive care reminders to display for this patient.     Health Maintenance Due   Topic Date Due    HIV Screening  Never done    COVID-19 Vaccine  (4 - Booster for Pfizer series) 05/12/2022         Past Medical History:   Diagnosis Date    ALLERGIC RHINITIS 5/15/2012    Dyslipidemia     CLEVELAND (generalized anxiety disorder)     History of corneal abrasion     both eyes x 4    Hyperlipidemia     Hypertension     Iritis 11/09 and 2011    right eye x 2    Iritis     Migraine     Sinusitis        Past Surgical History:   Procedure Laterality Date    BELPHAROPTOSIS REPAIR      left upper lid    coronary art stent  01/17/2019    Dr Hackett placed; dRCA    LEFT HEART CATHETERIZATION Left 1/17/2019    Procedure: Left heart cath;  Surgeon: Miguel A Hackett MD;  [email or web address] CATH;  Service: Cardiology;  Laterality: Left;    TYMPANOSTOMY TUBE PLACEMENT      WISDOM TOOTH EXTRACTION         Social History     Tobacco Use    Smoking status: Never Smoker    Smokeless tobacco: Never Used   Substance Use Topics    Alcohol use: Yes     Comment: RARE    Drug use: No       Family History   Problem Relation Age of Onset    Diabetes Mother     Diabetes Maternal Grandmother     Cataracts Maternal Grandmother     Heart disease Father 58        stents since 96    Heart disease Sister         dx CAD at 58.       Review of patient's allergies indicates:   Allergen Reactions    Atorvastatin      Markedly elevated CPK but also working out intensely at the time and fall through ceiling with right quad pain    Hydrochlorothiazide      Led to increased BP and pulse rate; aslo atypical chest pain and palpitations.    Penicillins Anaphylaxis    Shellfish containing products      abd cramping/diarrhea; to shellfish    Latex, natural rubber Rash and Blisters       Current Outpatient Medications on File Prior to Visit   Medication Sig Dispense Refill    albuterol (VENTOLIN HFA) 90 mcg/actuation inhaler Rescue; take 2 puffs every 4-6 hours as needed for wheezing/coughing spasm/shortness of breath 18 g 2    amLODIPine (NORVASC) 5 MG tablet TAKE 1 TABLET BY MOUTH EVERY DAY 90  tablet 1    aspirin (ECOTRIN) 81 MG EC tablet Take 81 mg by mouth once daily.      benzonatate (TESSALON) 100 MG capsule 100-200 mg p.o. t.i.d. p.r.n. cough/coughing spasms 30 capsule 1    busPIRone (BUSPAR) 15 MG tablet TAKE 1/3-1/2 TABLET BY MOUTH 3 TIMES A DAY AS NEEDED FOR ANXIETY. 30 tablet 2    clopidogreL (PLAVIX) 75 mg tablet Take 1 tablet (75 mg total) by mouth once daily. 30 tablet 0    cyclobenzaprine (FLEXERIL) 10 MG tablet Take 1 tablet (10 mg total) by mouth 3 (three) times daily as needed for Muscle spasms (lower back pain). 42 tablet 2    ezetimibe (ZETIA) 10 mg tablet TAKE 1/2 TABLET BY MOUTH DAILY 45 tablet 0    ibuprofen (ADVIL,MOTRIN) 600 MG tablet 600 mg po TID as needed for pain 30 tablet 2    loratadine (CLARITIN) 10 mg tablet Take 1 tablet (10 mg total) by mouth once daily. As needed for congestion. 30 tablet 1    metoprolol succinate (TOPROL-XL) 25 MG 24 hr tablet TAKE 1 TABLET BY MOUTH EVERY DAY 90 tablet 1    multivitamin (THERAGRAN) per tablet Take 1 tablet by mouth once daily.      rosuvastatin (CRESTOR) 20 MG tablet TAKE 1 TABLET BY MOUTH EVERY DAY 90 tablet 3    telmisartan (MICARDIS) 40 MG Tab TAKE 1 TABLET BY MOUTH EVERY DAY 90 tablet 0     No current facility-administered medications on file prior to visit.       Physical Exam  Constitutional:       General: He is not in acute distress.     Appearance: He is well-developed. He is not diaphoretic.   HENT:      Head: Normocephalic and atraumatic.   Pulmonary:      Effort: Pulmonary effort is normal. No respiratory distress.   Musculoskeletal:         General: Normal range of motion.   Neurological:      Mental Status: He is alert and oriented to person, place, and time.   Psychiatric:         Behavior: Behavior normal.         Assessment:       1. Essential hypertension    2. C[place] (generalized anxiety disorder)    3. Mixed hyperlipidemia    4. Dyslipidemia (high LDL; low HDL)    5. Coronary artery disease, S/P stent 1/19     6. Family history of coronary artery disease    7. Seasonal allergies    8. Allergic rhinitis due to pollen, unspecified seasonality    9. Encounter for laboratory test    10. Weight gain    11. Obesity with serious comorbidity, unspecified classification, unspecified obesity type    12. Impaired fasting glucose        Plan:       Essential hypertension: Maintain < 2 Gm Na a day diet, and monitor BP at home; keep a log.  Wait for 5 minutes after being seated in the morning before running the blood pressure cuff.  On telmisartan and metoprolol succinate as well as amlodipine  -     Lipid Panel; Future; Expected date: 07/17/2022  -     Comprehensive Metabolic Panel; Future; Expected date: 07/17/2022  -     CBC Auto Differential; Future; Expected date: 07/17/2022  -     Magnesium; Future; Expected date: 07/17/2022    [place] (generalized anxiety disorder): Limit caffeine intake with stress reduction and regular exercise as tolerated.  Has BuSpar to use as needed.    Mixed hyperlipidemia: Maintain low fat high fiber diet, exercise regularly. Weight reduction where indicated.  Continue rosuvastatin and Zetia half of a 10 mg tablet daily needs lipid profile updated on follow-up  -     Lipid Panel; Future; Expected date: 07/17/2022  -     Comprehensive Metabolic Panel; Future; Expected date: 07/17/2022    Dyslipidemia (high LDL; low HDL):  As above and increase aerobic activity  -     Lipid Panel; Future; Expected date: 07/17/2022  -     Comprehensive Metabolic Panel; Future; Expected date: 07/17/2022    Coronary artery disease, S/P stent 1/19: CAD has been stable without any chest pain, shortness of breath, or palpitations. Keep follow up with cardiologist as directed.     Family history of coronary artery disease    Seasonal allergies  -     loratadine (CLARITIN) 10 mg tablet; Take 1 tablet (10 mg total) by mouth daily as needed for Allergies. Please provide generic  Dispense: 90 tablet; Refill: 1  -     mometasone  (NASONEX) 50 mcg/actuation nasal spray; One spray twice daily as needed for nasal congestion.  Please provide generic  Dispense: 51 g; Refill: 1  -     CBC Auto Differential; Future; Expected date: 07/17/2022    Allergic rhinitis due to pollen, unspecified seasonality  -     loratadine (CLARITIN) 10 mg tablet; Take 1 tablet (10 mg total) by mouth daily as needed for Allergies. Please provide generic  Dispense: 90 tablet; Refill: 1  -     mometasone (NASONEX) 50 mcg/actuation nasal spray; One spray twice daily as needed for nasal congestion.  Please provide generic  Dispense: 51 g; Refill: 1  -     CBC Auto Differential; Future; Expected date: 07/17/2022    Encounter for laboratory test:  Labs reviewed and discussed with patient at length in ordered for follow-up.    Weight gain:  Has gone up in his weight to 240-244, 231 this morning or back down 13 lbs, his goal is 200 lb; will adhere to diet and exercise a lot tighter.  -     Lipid Panel; Future; Expected date: 07/17/2022  -     Comprehensive Metabolic Panel; Future; Expected date: 07/17/2022    Obesity with serious comorbidity, unspecified classification, unspecified obesity type: Caloric restriction w regular exercise and weight reduction.  -     Lipid Panel; Future; Expected date: 07/17/2022  -     Comprehensive Metabolic Panel; Future; Expected date: 07/17/2022    Impaired fasting glucose: Exercise recommended with weight reduction and low carb diet; we'll follow hemoglobin A1c's with you periodically.  -     Comprehensive Metabolic Panel; Future; Expected date: 07/17/2022  -     Hemoglobin A1C; Future; Expected date: 07/17/2022    Prostate cancer screening:  PSA 1.7; 1 year ago was 1.2.  Will repeat in 12 months.

## 2022-07-17 PROBLEM — R73.01 IMPAIRED FASTING GLUCOSE: Status: ACTIVE | Noted: 2022-07-17

## 2022-07-17 PROBLEM — E66.9 OBESITY WITH SERIOUS COMORBIDITY: Status: ACTIVE | Noted: 2022-07-17

## 2022-07-17 PROBLEM — J30.1 ALLERGIC RHINITIS DUE TO POLLEN: Status: ACTIVE | Noted: 2022-07-17

## 2022-07-17 PROBLEM — Z01.89 ENCOUNTER FOR LABORATORY TEST: Status: ACTIVE | Noted: 2022-07-17

## 2022-07-17 PROBLEM — Z12.5 PROSTATE CANCER SCREENING: Status: ACTIVE | Noted: 2022-07-17

## 2022-07-17 NOTE — PATIENT INSTRUCTIONS
Essential hypertension: Maintain < 2 Gm Na a day diet, and monitor BP at home; keep a log.  Wait for 5 minutes after being seated in the morning before running the blood pressure cuff.  On telmisartan and metoprolol succinate as well as amlodipine  -     Lipid Panel; Future; Expected date: 07/17/2022  -     Comprehensive Metabolic Panel; Future; Expected date: 07/17/2022  -     CBC Auto Differential; Future; Expected date: 07/17/2022  -     Magnesium; Future; Expected date: 07/17/2022    [place] (generalized anxiety disorder): Limit caffeine intake with stress reduction and regular exercise as tolerated.  Has BuSpar to use as needed.    Mixed hyperlipidemia: Maintain low fat high fiber diet, exercise regularly. Weight reduction where indicated.  Continue rosuvastatin and Zetia half of a 10 mg tablet daily needs lipid profile updated on follow-up  -     Lipid Panel; Future; Expected date: 07/17/2022  -     Comprehensive Metabolic Panel; Future; Expected date: 07/17/2022    Dyslipidemia (high LDL; low HDL):  As above and increase aerobic activity  -     Lipid Panel; Future; Expected date: 07/17/2022  -     Comprehensive Metabolic Panel; Future; Expected date: 07/17/2022    Coronary artery disease, S/P stent 1/19: CAD has been stable without any chest pain, shortness of breath, or palpitations. Keep follow up with cardiologist as directed.     Family history of coronary artery disease    Seasonal allergies  -     loratadine (CLARITIN) 10 mg tablet; Take 1 tablet (10 mg total) by mouth daily as needed for Allergies. Please provide generic  Dispense: 90 tablet; Refill: 1  -     mometasone (NASONEX) 50 mcg/actuation nasal spray; One spray twice daily as needed for nasal congestion.  Please provide generic  Dispense: 51 g; Refill: 1  -     CBC Auto Differential; Future; Expected date: 07/17/2022    Allergic rhinitis due to pollen, unspecified seasonality  -     loratadine (CLARITIN) 10 mg tablet; Take 1 tablet (10 mg total) by  mouth daily as needed for Allergies. Please provide generic  Dispense: 90 tablet; Refill: 1  -     mometasone (NASONEX) 50 mcg/actuation nasal spray; One spray twice daily as needed for nasal congestion.  Please provide generic  Dispense: 51 g; Refill: 1  -     CBC Auto Differential; Future; Expected date: 07/17/2022    Encounter for laboratory test:  Labs reviewed and discussed with patient at length in ordered for follow-up.    Weight gain:  Has gone up in his weight to 240-244, 231 this morning or back down 13 lbs, his goal is 200 lb; will adhere to diet and exercise a lot tighter.  -     Lipid Panel; Future; Expected date: 07/17/2022  -     Comprehensive Metabolic Panel; Future; Expected date: 07/17/2022    Obesity with serious comorbidity, unspecified classification, unspecified obesity type: Caloric restriction w regular exercise and weight reduction.  -     Lipid Panel; Future; Expected date: 07/17/2022  -     Comprehensive Metabolic Panel; Future; Expected date: 07/17/2022    Impaired fasting glucose: Exercise recommended with weight reduction and low carb diet; we'll follow hemoglobin A1c's with you periodically.  -     Comprehensive Metabolic Panel; Future; Expected date: 07/17/2022  -     Hemoglobin A1C; Future; Expected date: 07/17/2022    Prostate cancer screening:  PSA 1.7; 1 year ago was 1.2.  Will repeat in 12 months.

## 2022-09-10 DIAGNOSIS — I25.83 CORONARY ARTERY DISEASE DUE TO LIPID RICH PLAQUE: ICD-10-CM

## 2022-09-10 DIAGNOSIS — I25.10 CORONARY ARTERY DISEASE DUE TO LIPID RICH PLAQUE: ICD-10-CM

## 2022-09-10 DIAGNOSIS — I10 ESSENTIAL HYPERTENSION: ICD-10-CM

## 2022-09-10 NOTE — TELEPHONE ENCOUNTER
Care Due:                  Date            Visit Type   Department     Provider  --------------------------------------------------------------------------------                                ESTABLISHED                              PATIENT -    [facility] FAM[facility]  Last Visit: 07-      [facility]      MEDICINE       Deonte Miramontes  Next Visit: None Scheduled  None         None Found                                                            Last  Test          Frequency    Reason                     Performed    Due Date  --------------------------------------------------------------------------------    CMP.........  12 months..  ezetimibe, rosuvastatin,   08- 08-                             telmisartan..............    Lipid Panel.  12 months..  ezetimibe, rosuvastatin..  08- 08-    [facility] Embedded Care Gaps. Reference number: [id number]. 9/10/2022   8:45:42 AM CDT

## 2022-09-10 NOTE — TELEPHONE ENCOUNTER
Refill Routing Note   Medication(s) are not appropriate for processing by [facility] for the following reason(s):      - Required laboratory values are outdated    [facility] action(s):  Defer Medication-related problems identified: Requires labs        Medication reconciliation completed: No     Appointments  past 12m or future 3m with PCP    Date Provider   Last Visit   7/5/2022 Deonte Miramontes MD   Next Visit   Visit date not found Deonte Miramontes MD   ED visits in past 90 days: 0        Note composed:2:20 PM 09/10/2022

## 2022-09-11 RX ORDER — TELMISARTAN 40 MG/1
TABLET ORAL
Qty: 90 TABLET | Refills: 1 | Status: SHIPPED | OUTPATIENT
Start: 2022-09-11 | End: 2023-03-14

## 2022-10-06 ENCOUNTER — TELEPHONE (OUTPATIENT)
Dept: FAMILY MEDICINE | Facility: CLINIC | Age: 52
End: 2022-10-06
Payer: COMMERCIAL

## 2022-10-06 NOTE — TELEPHONE ENCOUNTER
Attempted to contact pt. No answer, unable to leave message.    Orders at [facility]- ready for pt scheduling.

## 2022-10-06 NOTE — TELEPHONE ENCOUNTER
----- Message from Brooke March sent at 10/6/2022  4:17 PM CDT -----  Contact: Patient  Type: Patient Call Back         Who called: Patient         What is the request in detail: state she needs blood work for 10/31 appt sent to [facility] to sched before next week; states they were sent over 2 month s ago but he wants to make sure they still have orders ready for sched; please advise         Can the clinic reply by [facility]? yes         Would the patient rather a call back or a response via [facility]? deborah         Best call back number: [phone]         Additional Information:            Thank You

## 2022-10-19 LAB
ALBUMIN SERPL-MCNC: 4.4 G/DL (ref 3.8–4.9)
ALBUMIN/GLOB SERPL: 2 {RATIO} (ref 1.2–2.2)
ALP SERPL-CCNC: 75 IU/L (ref 44–121)
ALT SERPL-CCNC: 25 IU/L (ref 0–44)
AST SERPL-CCNC: 21 IU/L (ref 0–40)
BASOPHILS # BLD AUTO: 0.1 X10E3/UL (ref 0–0.2)
BASOPHILS NFR BLD AUTO: 1 %
BILIRUB SERPL-MCNC: 0.5 MG/DL (ref 0–1.2)
BUN SERPL-MCNC: 11 MG/DL (ref 6–24)
BUN/CREAT SERPL: 12 (ref 9–20)
CALCIUM SERPL-MCNC: 9.2 MG/DL (ref 8.7–10.2)
CHLORIDE SERPL-SCNC: 102 MMOL/L (ref 96–106)
CHOLEST SERPL-MCNC: 134 MG/DL (ref 100–199)
CO2 SERPL-SCNC: 24 MMOL/L (ref 20–29)
CREAT SERPL-MCNC: 0.95 MG/DL (ref 0.76–1.27)
EOSINOPHIL # BLD AUTO: 0.1 X10E3/UL (ref 0–0.4)
EOSINOPHIL NFR BLD AUTO: 3 %
ERYTHROCYTE [DISTWIDTH] IN BLOOD BY AUTOMATED COUNT: 12.8 % (ref 11.6–15.4)
EST. GFR  (NO RACE VARIABLE): 96 ML/MIN/1.73
GLOBULIN SER CALC-MCNC: 2.2 G/DL (ref 1.5–4.5)
GLUCOSE SERPL-MCNC: 91 MG/DL (ref 70–99)
HBA1C MFR BLD: 5.6 % (ref 4.8–5.6)
HCT VFR BLD AUTO: 44.8 % (ref 37.5–51)
HDLC SERPL-MCNC: 47 MG/DL
HGB BLD-MCNC: 15.4 G/DL (ref 13–17.7)
IMM GRANULOCYTES # BLD AUTO: 0 X10E3/UL (ref 0–0.1)
IMM GRANULOCYTES NFR BLD AUTO: 0 %
LDLC SERPL CALC-MCNC: 69 MG/DL (ref 0–99)
LYMPHOCYTES # BLD AUTO: 1.9 X10E3/UL (ref 0.7–3.1)
LYMPHOCYTES NFR BLD AUTO: 39 %
MCH RBC QN AUTO: 30.9 PG (ref 26.6–33)
MCHC RBC AUTO-ENTMCNC: 34.4 G/DL (ref 31.5–35.7)
MCV RBC AUTO: 90 FL (ref 79–97)
MONOCYTES # BLD AUTO: 0.4 X10E3/UL (ref 0.1–0.9)
MONOCYTES NFR BLD AUTO: 8 %
NEUTROPHILS # BLD AUTO: 2.4 X10E3/UL (ref 1.4–7)
NEUTROPHILS NFR BLD AUTO: 49 %
PLATELET # BLD AUTO: 184 X10E3/UL (ref 150–450)
POTASSIUM SERPL-SCNC: 4.9 MMOL/L (ref 3.5–5.2)
PROT SERPL-MCNC: 6.6 G/DL (ref 6–8.5)
RBC # BLD AUTO: 4.99 X10E6/UL (ref 4.14–5.8)
SODIUM SERPL-SCNC: 139 MMOL/L (ref 134–144)
TRIGL SERPL-MCNC: 93 MG/DL (ref 0–149)
VLDLC SERPL CALC-MCNC: 18 MG/DL (ref 5–40)
WBC # BLD AUTO: 4.8 X10E3/UL (ref 3.4–10.8)

## 2022-10-28 ENCOUNTER — TELEPHONE (OUTPATIENT)
Dept: FAMILY MEDICINE | Facility: CLINIC | Age: 52
End: 2022-10-28
Payer: COMMERCIAL

## 2022-10-28 NOTE — TELEPHONE ENCOUNTER
Please notify patient that I have reviewed his lab results and there are no significant abnormalities.  His cholesterol panel returned back about the same.  Maintain low-fat high-fiber diet and exercise regularly and work at getting his weight down.  Monitor his blood pressure at home and remind him to sit for least 5 minutes with his feet flat on the ground before he takes his blood pressure reading in the morning.  Will check in December what his blood pressures been doing with his bring in the log in and checking in the office and I will see him at that time as well.  Please schedule him with an appointment to see me somewhere in mid December

## 2022-10-29 DIAGNOSIS — F41.8 SITUATIONAL ANXIETY: ICD-10-CM

## 2022-10-31 RX ORDER — BUSPIRONE HYDROCHLORIDE 15 MG/1
TABLET ORAL
Qty: 30 TABLET | Refills: 2 | Status: SHIPPED | OUTPATIENT
Start: 2022-10-31 | End: 2023-02-05

## 2022-11-01 DIAGNOSIS — I10 ESSENTIAL HYPERTENSION: ICD-10-CM

## 2022-11-01 NOTE — TELEPHONE ENCOUNTER
Refill Routing Note   Medication(s) are not appropriate for processing by [facility] for the following reason(s):      - Required vitals are abnormal    [facility] action(s):  Defer          Medication reconciliation completed: No     Appointments  past 12m or future 3m with PCP    Date Provider   Last Visit   7/5/2022 Deonte Miramontes MD   Next Visit   12/8/2022 Deonte Miramontes MD   ED visits in past 90 days: 0        Note composed:5:03 PM 11/01/2022

## 2022-11-01 NOTE — TELEPHONE ENCOUNTER
CDU Discharge Summary        Patient:  Konstantin Mclaughlin  YOB: 1979    MRN: [id number]   Acct: [de-identified]    Primary Care Physician: No primary care provider on file. Admit date:  10/2/2018  1:55 PM  Discharge date: 10/3/2018  5:13 PM     Discharge Diagnoses:     Acute rectal bleeding secondary to hemorrhoids identified by colonoscopy    Follow-up:  Call today/tomorrow for a follow up appointment with No primary care provider on file. , or return to the Emergency Room with worsening symptoms    Stressed to patient the importance of following up with primary care doctor for further workup/management of symptoms. Pt verbalizes understanding and agrees with plan.     Discharge Medications:  Changes to medications          [facility] Medication Instructions TUK:[id number]    Printed on:10/05/18 1322   Medication Information                      polyethylene glycol (MIRALAX) powder  Take 17 g by mouth daily                 Diet:    , Advance as tolerated     Activity:  As tolerated    Consultants: None    Procedures:  Not indicated     Diagnostic Test:   Results for orders placed or performed during the hospital encounter of 10/02/18   CBC   Result Value Ref Range    WBC 5.9 3.5 - 11.3 k/uL    RBC 5.17 4.21 - 5.77 m/uL    Hemoglobin 17.0 13.0 - 17.0 g/dL    Hematocrit 50.7 (H) 40.7 - 50.3 %    MCV 98.1 82.6 - 102.9 fL    MCH 32.9 25.2 - 33.5 pg    MCHC 33.5 28.4 - 34.8 g/dL    RDW 12.6 11.8 - 14.4 %    Platelets 345 492 - 631 k/uL    MPV 10.3 8.1 - 13.5 fL    NRBC Automated 0.0 0.0 per 100 WBC   Comprehensive Metabolic Panel   Result Value Ref Range    Glucose 129 (H) 70 - 99 mg/dL    BUN 7 6 - 20 mg/dL    CREATININE 0.62 (L) 0.70 - 1.20 mg/dL    Bun/Cre Ratio NOT REPORTED 9 - 20    Calcium 9.7 8.6 - 10.4 mg/dL    Sodium 138 135 - 144 mmol/L    Potassium 3.9 3.7 - 5.3 mmol/L    Chloride 98 98 - 107 mmol/L    CO2 28 20 - 31 mmol/L    Anion Gap 12 9 - 17 mmol/L    Alkaline Phosphatase 51 No new care gaps identified.  [facility] Embedded Care Gaps. Reference number: [id number]. 11/01/2022   12:37:57 AM SARAHT   patient and they are in understanding. They were instructed to read discharge paperwork. All of their questions and concerns were addressed. Time Spent: 0 day      --  Davonte Chaney MD  Emergency Medicine Resident Physician    This dictation was generated by voice recognition computer software. Although all attempts are made to edit the dictation for accuracy, there may be errors in the transcription that are not intended.

## 2022-11-03 RX ORDER — AMLODIPINE BESYLATE 5 MG/1
TABLET ORAL
Qty: 90 TABLET | Refills: 1 | Status: SHIPPED | OUTPATIENT
Start: 2022-11-03 | End: 2023-05-04

## 2022-11-23 ENCOUNTER — PATIENT OUTREACH (OUTPATIENT)
Dept: ADMINISTRATIVE | Facility: HOSPITAL | Age: 52
End: 2022-11-23
Payer: COMMERCIAL

## 2022-11-23 NOTE — PROGRESS NOTES
Non-compliant report chart audits for COLON CANCER SCREENING. Chart review completed for HM test overdue (mammograms, Colonoscopies, pap smears, DM labs, and/or EYE EXAMs)      Care Everywhere and media, updates requested and reviewed.      Outreach to patient in reference to colon cancer screening.    RE:  Patient needs colon cancer screening    Pt will complete fitkit    Outreach:  Colon cancer screening

## 2022-11-29 ENCOUNTER — PATIENT OUTREACH (OUTPATIENT)
Dept: ADMINISTRATIVE | Facility: HOSPITAL | Age: 52
End: 2022-11-29
Payer: COMMERCIAL

## 2022-11-29 DIAGNOSIS — Z12.11 SCREEN FOR COLON CANCER: Primary | ICD-10-CM

## 2022-12-08 ENCOUNTER — OFFICE VISIT (OUTPATIENT)
Dept: FAMILY MEDICINE | Facility: CLINIC | Age: 52
End: 2022-12-08
Payer: COMMERCIAL

## 2022-12-08 DIAGNOSIS — R73.01 IMPAIRED FASTING BLOOD SUGAR: ICD-10-CM

## 2022-12-08 DIAGNOSIS — Z82.49 FAMILY HISTORY OF CORONARY ARTERY DISEASE: ICD-10-CM

## 2022-12-08 DIAGNOSIS — Z01.89 ENCOUNTER FOR LABORATORY TEST: ICD-10-CM

## 2022-12-08 DIAGNOSIS — I25.10 CORONARY ARTERY DISEASE DUE TO LIPID RICH PLAQUE: ICD-10-CM

## 2022-12-08 DIAGNOSIS — E78.2 MIXED HYPERLIPIDEMIA: ICD-10-CM

## 2022-12-08 DIAGNOSIS — I25.83 CORONARY ARTERY DISEASE DUE TO LIPID RICH PLAQUE: ICD-10-CM

## 2022-12-08 DIAGNOSIS — I10 ESSENTIAL HYPERTENSION: Primary | ICD-10-CM

## 2022-12-08 DIAGNOSIS — E78.5 DYSLIPIDEMIA (HIGH LDL; LOW HDL): ICD-10-CM

## 2022-12-08 PROCEDURE — 1159F MED LIST DOCD IN RCRD: CPT | Mod: CPTII,95,, | Performed by: INTERNAL MEDICINE

## 2022-12-08 PROCEDURE — 3044F PR MOST RECENT HEMOGLOBIN A1C LEVEL <7.0%: ICD-10-PCS | Mod: CPTII,95,, | Performed by: INTERNAL MEDICINE

## 2022-12-08 PROCEDURE — 1160F RVW MEDS BY RX/DR IN RCRD: CPT | Mod: CPTII,95,, | Performed by: INTERNAL MEDICINE

## 2022-12-08 PROCEDURE — 99214 PR OFFICE/OUTPT VISIT, EST, LEVL IV, 30-39 MIN: ICD-10-PCS | Mod: 95,,, | Performed by: INTERNAL MEDICINE

## 2022-12-08 PROCEDURE — 4010F ACE/ARB THERAPY RXD/TAKEN: CPT | Mod: CPTII,95,, | Performed by: INTERNAL MEDICINE

## 2022-12-08 PROCEDURE — 4010F PR ACE/ARB THEARPY RXD/TAKEN: ICD-10-PCS | Mod: CPTII,95,, | Performed by: INTERNAL MEDICINE

## 2022-12-08 PROCEDURE — 99214 OFFICE O/P EST MOD 30 MIN: CPT | Mod: 95,,, | Performed by: INTERNAL MEDICINE

## 2022-12-08 PROCEDURE — 1160F PR REVIEW ALL MEDS BY PRESCRIBER/CLIN PHARMACIST DOCUMENTED: ICD-10-PCS | Mod: CPTII,95,, | Performed by: INTERNAL MEDICINE

## 2022-12-08 PROCEDURE — 3044F HG A1C LEVEL LT 7.0%: CPT | Mod: CPTII,95,, | Performed by: INTERNAL MEDICINE

## 2022-12-08 PROCEDURE — 1159F PR MEDICATION LIST DOCUMENTED IN MEDICAL RECORD: ICD-10-PCS | Mod: CPTII,95,, | Performed by: INTERNAL MEDICINE

## 2022-12-08 NOTE — Clinical Note
Please call patient ask him to schedule follow-up appointment with me in 3 months.  He is to obtain lab work 1 week prior after an overnight fast to include a lipid panel and CMP; these have been [facility] for him to be obtained 1 week before his visit after an overnight fast.  Updated after visit summary is available for viewing in the portal

## 2022-12-26 NOTE — PATIENT INSTRUCTIONS
Essential hypertension: Maintain < 2 Gm Na a day diet, and monitor BP at home; keep a log.  Keep a log for office review.  Sit and wait in the chair with both feet flat on the ground before checking blood pressure in the morning and wait for 5 minutes before running the blood pressure cuff after being seated.  Blood pressure at [place] him 130-134/80-84 also advised that he needs to get a new cuff.  Recommended better adherence to low-salt intake as well as adding a DASH diet; which he can Google on line.  Also to add regular exercise into his weekly routine.  Limit caffeine intake    Mixed hyperlipidemia:  On Zetia 10 mg daily and rosuvastatin 20 mg.  Lipid profile:  Cholesterol 134/triglyceride 93/HDL 47/LDL 69.  Work on diet and exercise a little bit harder LDL goal less than 60    Dyslipidemia (high LDL; low HDL): Increase aerobic activity    Coronary artery disease, S/P stent 1/19: No complaints of chest pain, shortness breast or heart palpitations.  Keep follow-up with cardiology as directed.    Family history of coronary artery disease    Encounter for laboratory test: Labs reviewed with patient at length

## 2022-12-26 NOTE — PROGRESS NOTES
Subjective:    Audio Only Telehealth Visit     The patient location is:  Home  The chief complaint leading to consultation is:  Three-month follow-up hypertension and lipid management.  Visit type: Virtual visit with audio only (telephone)  Total time spent with patient: 7 minutes     The reason for the audio only service rather than synchronous audio and video virtual visit was related to technical difficulties or patient preference/necessity.     Each patient to whom I provide medical services by telemedicine is:  (1) informed of the relationship between the physician and patient and the respective role of any other health care provider with respect to management of the patient; and (2) notified that they may decline to receive medical services by telemedicine and may withdraw from such care at any time. Patient verbally consented to receive this service via voice-only telephone call.       HPI:  Here today for as review of his lab work as well as reassessment of his hypertension anxiety and lipid disorders     Assessment and plan:  Please see below     This service was not originating from a related E/M service provided within the previous 7 days nor will  to an E/M service or procedure within the next 24 hours or my soonest available appointment.  Prevailing standard of care was able to be met in this audio-only visit.      Patient ID: Steven Stephen is a 52 y.o. male.    Chief Complaint: No chief complaint on file.    HPI: Here for reassessment and go over his lab work.  Audio visit; not able to do audiovisual  Essential hypertension: Maintain < 2 Gm Na a day diet, and monitor BP at home; keep a log.  Keep a log for office review.  Sit and wait in the chair with both feet flat on the ground before checking blood pressure in the morning and wait for 5 minutes before running the blood pressure cuff after being seated.  Blood pressure at [place] him 130-134/80-84 also advised that he needs to get a new  cuff.  Recommended better adherence to low-salt intake as well as adding a DASH diet; which he can Google on line.  Also to add regular exercise into his weekly routine.  Limit caffeine intake    Mixed hyperlipidemia:  On Zetia 10 mg daily and rosuvastatin 20 mg.  Lipid profile:  Cholesterol 134/triglyceride 93/HDL 47/LDL 69.  Work on diet and exercise a little bit harder LDL goal less than 60    Dyslipidemia (high LDL; low HDL): Increase aerobic activity    Coronary artery disease, S/P stent 1/19: No complaints of chest pain, shortness breast or heart palpitations.  Keep follow-up with cardiology as directed.    Family history of coronary artery disease    Impaired fasting blood sugar: 10/18 fasting blood sugar 91 with A1c 5.6; 8/23 impaired fasting blood sugar 114.  Limit carbohydrate intake and exercise regularly with continued attempts at weight reduction    Encounter for laboratory test: Labs reviewed with patient at length       Review of Systems   Constitutional:  Negative for activity change and unexpected weight change.   HENT:  Negative for hearing loss, rhinorrhea and trouble swallowing.    Eyes:  Negative for discharge and visual disturbance.   Respiratory:  Negative for chest tightness and wheezing.    Cardiovascular:  Negative for chest pain and palpitations.   Gastrointestinal:  Negative for blood in stool, constipation, diarrhea and vomiting.   Endocrine: Negative for polydipsia and polyuria.   Genitourinary:  Negative for difficulty urinating, hematuria and urgency.   Musculoskeletal:  Negative for arthralgias, joint swelling and neck pain.   Neurological:  Negative for weakness and headaches.   Psychiatric/Behavioral:  Negative for confusion and dysphoric mood.     Objective:        There were no vitals filed for this visit.    BMI Readings from Last 3 Encounters:   12/28/21 31.21 kg/m²   06/12/20 27.99 kg/m²   12/11/19 29.92 kg/m²        Wt Readings from Last 3 Encounters:   12/28/21 1323 110.2 kg  (243 lb 0.9 oz)   06/12/20 1346 98.9 kg (218 lb)   12/11/19 0915 105.7 kg (233 lb 0.4 oz)        BP Readings from Last 3 Encounters:   12/28/21 (!) 140/85   05/27/21 125/72   01/15/21 125/80        There are no preventive care reminders to display for this patient.     Health Maintenance Due   Topic Date Due    HIV Screening  Never done    COVID-19 Vaccine (4 - Booster for Pfizer series) 03/09/2022         Past Medical History:   Diagnosis Date    ALLERGIC RHINITIS 5/15/2012    Dyslipidemia     [place] (generalized anxiety disorder)     History of corneal abrasion     both eyes x 4    Hyperlipidemia     Hypertension     Iritis 11/09 and 2011    right eye x 2    Iritis     Migraine     Sinusitis        Past Surgical History:   Procedure Laterality Date    BELPHAROPTOSIS REPAIR      left upper lid    coronary art stent  01/17/2019    Dr Hackett placed; dRCA    LEFT HEART CATHETERIZATION Left 1/17/2019    Procedure: Left heart cath;  Surgeon: Miguel A Hackett MD;  Location: [facility] CATH;  Service: Cardiology;  Laterality: Left;    TYMPANOSTOMY TUBE PLACEMENT      WISDOM TOOTH EXTRACTION         Social History     Tobacco Use    Smoking status: Never    Smokeless tobacco: Never   Substance Use Topics    Alcohol use: Yes     Comment: RARE    Drug use: No       Family History   Problem Relation Age of Onset    Diabetes Mother     Diabetes Maternal Grandmother     Cataracts Maternal Grandmother     Heart disease Father 58        stents since 96    Heart disease Sister         dx CAD at 58.       Review of patient's allergies indicates:   Allergen Reactions    Atorvastatin      Markedly elevated CPK but also working out intensely at the time and fall through ceiling with right quad pain    Hydrochlorothiazide      Led to increased BP and pulse rate; aslo atypical chest pain and palpitations.    Penicillins Anaphylaxis    Shellfish containing products      abd cramping/diarrhea; to shellfish    Latex, natural rubber Rash and Blisters        Current Outpatient Medications on File Prior to Visit   Medication Sig Dispense Refill    albuterol (VENTOLIN HFA) 90 mcg/actuation inhaler Rescue; take 2 puffs every 4-6 hours as needed for wheezing/coughing spasm/shortness of breath 18 g 2    amLODIPine (NORVASC) 5 MG tablet TAKE 1 TABLET BY MOUTH EVERY DAY 90 tablet 1    aspirin (ECOTRIN) 81 MG EC tablet Take 81 mg by mouth once daily.      benzonatate (TESSALON) 100 MG capsule 100-200 mg p.o. t.i.d. p.r.n. cough/coughing spasms 30 capsule 1    busPIRone (BUSPAR) 15 MG tablet TAKE 1/3-1/2 TABLET BY MOUTH 3 TIMES A DAY AS NEEDED FOR ANXIETY. 30 tablet 2    clopidogreL (PLAVIX) 75 mg tablet Take 1 tablet (75 mg total) by mouth once daily. 30 tablet 0    cyclobenzaprine (FLEXERIL) 10 MG tablet Take 1 tablet (10 mg total) by mouth 3 (three) times daily as needed for Muscle spasms (lower back pain). 42 tablet 2    ezetimibe (ZETIA) 10 mg tablet TAKE 1/2 TABLET BY MOUTH DAILY 45 tablet 0    ibuprofen (ADVIL,MOTRIN) 600 MG tablet 600 mg po TID as needed for pain 30 tablet 2    loratadine (CLARITIN) 10 mg tablet Take 1 tablet (10 mg total) by mouth once daily. As needed for congestion. 30 tablet 1    loratadine (CLARITIN) 10 mg tablet Take 1 tablet (10 mg total) by mouth daily as needed for Allergies. Please provide generic 90 tablet 1    metoprolol succinate (TOPROL-XL) 25 MG 24 hr tablet TAKE 1 TABLET BY MOUTH EVERY DAY 90 tablet 1    mometasone (NASONEX) 50 mcg/actuation nasal spray ONE SPRAY TWICE DAILY AS NEEDED FOR NASAL CONGESTION. PLEASE PROVIDE GENERIC 51 each 1    multivitamin (THERAGRAN) per tablet Take 1 tablet by mouth once daily.      rosuvastatin (CRESTOR) 20 MG tablet TAKE 1 TABLET BY MOUTH EVERY DAY 90 tablet 3    telmisartan (MICARDIS) 40 MG Tab TAKE 1 TABLET BY MOUTH EVERY DAY 90 tablet 1     No current facility-administered medications on file prior to visit.       Physical Exam  Constitutional:       General: He is not in acute distress.      Appearance: He is well-developed. He is not diaphoretic.      Comments: Answers questions appropriately with good thought content.   Pulmonary:      Effort: Pulmonary effort is normal. No respiratory distress.   Neurological:      Mental Status: He is alert.   Psychiatric:         Mood and Affect: Mood normal.         Behavior: Behavior normal.         Thought Content: Thought content normal.       Assessment:       1. Essential hypertension    2. Mixed hyperlipidemia    3. Dyslipidemia (high LDL; low HDL)    4. Coronary artery disease, S/P stent 1/19    5. Family history of coronary artery disease    6. Encounter for laboratory test        Plan:       Essential hypertension: Maintain < 2 Gm Na a day diet, and monitor BP at home; keep a log.  Keep a log for office review.  Sit and wait in the chair with both feet flat on the ground before checking blood pressure in the morning and wait for 5 minutes before running the blood pressure cuff after being seated.  Blood pressure at [place] him 130-134/80-84 also advised that he needs to get a new cuff.  Recommended better adherence to low-salt intake as well as adding a DASH diet; which he can Google on line.  Also to add regular exercise into his weekly routine.  Limit caffeine intake    Mixed hyperlipidemia:  On Zetia 10 mg daily and rosuvastatin 20 mg.  Lipid profile:  Cholesterol 134/triglyceride 93/HDL 47/LDL 69.  Work on diet and exercise a little bit harder LDL goal less than 60    Dyslipidemia (high LDL; low HDL): Increase aerobic activity    Coronary artery disease, S/P stent 1/19: No complaints of chest pain, shortness breast or heart palpitations.  Keep follow-up with cardiology as directed.    Family history of coronary artery disease    Encounter for laboratory test: Labs reviewed with patient at length

## 2022-12-27 ENCOUNTER — LAB VISIT (OUTPATIENT)
Dept: LAB | Facility: HOSPITAL | Age: 52
End: 2022-12-27
Attending: INTERNAL MEDICINE
Payer: COMMERCIAL

## 2022-12-27 DIAGNOSIS — Z12.11 SCREEN FOR COLON CANCER: ICD-10-CM

## 2022-12-27 PROCEDURE — 82274 ASSAY TEST FOR BLOOD FECAL: CPT | Performed by: INTERNAL MEDICINE

## 2023-01-03 LAB — HEMOCCULT STL QL IA: NEGATIVE

## 2023-03-13 DIAGNOSIS — I10 ESSENTIAL HYPERTENSION: ICD-10-CM

## 2023-03-13 DIAGNOSIS — I25.10 CORONARY ARTERY DISEASE DUE TO LIPID RICH PLAQUE: ICD-10-CM

## 2023-03-13 DIAGNOSIS — I25.83 CORONARY ARTERY DISEASE DUE TO LIPID RICH PLAQUE: ICD-10-CM

## 2023-03-13 NOTE — TELEPHONE ENCOUNTER
No new care gaps identified.  [facility] Embedded Care Gaps. Reference number: [id number]. 3/13/2023   12:47:27 AM SARAHT

## 2023-03-13 NOTE — TELEPHONE ENCOUNTER
Refill Routing Note   Medication(s) are not appropriate for processing by [facility] for the following reason(s):       Required vitals outdated    [facility] action(s):  Defer         Appointments  past 12m or future 3m with PCP    Date Provider   Last Visit   12/8/2022 Deonte Miramontes MD   Next Visit   Visit date not found Deonte Miramontes MD   ED visits in past 90 days: 0        Note composed:7:43 AM 03/13/2023

## 2023-03-14 RX ORDER — TELMISARTAN 40 MG/1
TABLET ORAL
Qty: 90 TABLET | Refills: 1 | Status: SHIPPED | OUTPATIENT
Start: 2023-03-14 | End: 2023-09-12

## 2023-05-02 ENCOUNTER — PATIENT MESSAGE (OUTPATIENT)
Dept: FAMILY MEDICINE | Facility: CLINIC | Age: 53
End: 2023-05-02
Payer: COMMERCIAL

## 2023-05-02 ENCOUNTER — TELEPHONE (OUTPATIENT)
Dept: FAMILY MEDICINE | Facility: CLINIC | Age: 53
End: 2023-05-02
Payer: COMMERCIAL

## 2023-05-02 NOTE — TELEPHONE ENCOUNTER
Please call patient and ask him to schedule a follow-up appointment sometime in the next few weeks for reassessment he is past due since March.  Please also ask him to obtain his labs after an overnight fast 1 week prior.  These have been ordered previously.

## 2023-05-04 DIAGNOSIS — I10 ESSENTIAL HYPERTENSION: ICD-10-CM

## 2023-05-04 RX ORDER — AMLODIPINE BESYLATE 5 MG/1
TABLET ORAL
Qty: 90 TABLET | Refills: 1 | Status: SHIPPED | OUTPATIENT
Start: 2023-05-04

## 2023-05-04 NOTE — TELEPHONE ENCOUNTER
Refill Routing Note   Medication(s) are not appropriate for processing by [facility] for the following reason(s):      Required vitals outdated    [facility] action(s):  Defer              Appointments  past 12m or future 3m with PCP    Date Provider   Last Visit   12/8/2022 Deonte Miramontes MD   Next Visit   Visit date not found Deonte Miramontes MD   ED visits in past 90 days: 0        Note composed:5:58 AM 05/04/2023

## 2023-05-04 NOTE — TELEPHONE ENCOUNTER
No care due was identified.  [facility] Embedded Care Due Messages. Reference number: [id number].   5/04/2023 3:39:45 AM CDT

## 2023-05-21 DIAGNOSIS — F41.8 SITUATIONAL ANXIETY: ICD-10-CM

## 2023-05-22 NOTE — TELEPHONE ENCOUNTER
Refill Routing Note   Medication(s) are not appropriate for processing by [facility] for the following reason(s):      Medication outside of protocol    [facility] action(s):  Route None identified          Appointments  past 12m or future 3m with PCP    Date Provider   Last Visit   12/8/2022 Deonte Miramontes MD   Next Visit   Visit date not found Deonte Miramontes MD   ED visits in past 90 days: 0        Note composed:7:45 AM 05/22/2023

## 2023-05-23 RX ORDER — BUSPIRONE HYDROCHLORIDE 15 MG/1
TABLET ORAL
Qty: 30 TABLET | Refills: 1 | Status: SHIPPED | OUTPATIENT
Start: 2023-05-23

## 2023-05-23 NOTE — TELEPHONE ENCOUNTER
Please call patient and ask him to schedule follow-up appointment for reassessment sometime in the next several weeks as he has been past due since 03/08/2023.  He will also need to obtain his labs prior after an overnight fast which were sent previously in the end of December for him to [facility]

## 2023-07-06 DIAGNOSIS — J30.1 ALLERGIC RHINITIS DUE TO POLLEN, UNSPECIFIED SEASONALITY: ICD-10-CM

## 2023-07-06 DIAGNOSIS — J30.2 SEASONAL ALLERGIES: ICD-10-CM

## 2023-07-06 RX ORDER — MOMETASONE FUROATE 50 UG/1
SPRAY, METERED NASAL
Qty: 51 EACH | Refills: 1 | Status: SHIPPED | OUTPATIENT
Start: 2023-07-06

## 2023-07-06 NOTE — TELEPHONE ENCOUNTER
No care due was identified.  [facility] Embedded Care Due Messages. Reference number: [id number].   7/06/2023 12:47:28 AM CDT

## 2023-07-06 NOTE — TELEPHONE ENCOUNTER
Refill Decision Note   Steven Stephen  is requesting a refill authorization.  Brief Assessment and Rationale for Refill:  Approve     Medication Therapy Plan:         Comments:     Note composed:11:49 AM 07/06/2023

## 2023-09-12 ENCOUNTER — TELEPHONE (OUTPATIENT)
Dept: FAMILY MEDICINE | Facility: CLINIC | Age: 53
End: 2023-09-12

## 2023-09-12 DIAGNOSIS — I25.83 CORONARY ARTERY DISEASE DUE TO LIPID RICH PLAQUE: ICD-10-CM

## 2023-09-12 DIAGNOSIS — I25.10 CORONARY ARTERY DISEASE DUE TO LIPID RICH PLAQUE: ICD-10-CM

## 2023-09-12 DIAGNOSIS — I10 ESSENTIAL HYPERTENSION: ICD-10-CM

## 2023-09-12 RX ORDER — TELMISARTAN 40 MG/1
TABLET ORAL
Qty: 90 TABLET | Refills: 0 | Status: SHIPPED | OUTPATIENT
Start: 2023-09-12 | End: 2023-12-12

## 2023-09-12 NOTE — TELEPHONE ENCOUNTER
One-time refill request approved - patient will need to schedule clinic appointment prior to additional medication refills.     Amee Grande MD  [facility]  Office: [phone]   Fax: [phone]  09/12/2023

## 2023-10-04 ENCOUNTER — PATIENT MESSAGE (OUTPATIENT)
Dept: ADMINISTRATIVE | Facility: HOSPITAL | Age: 53
End: 2023-10-04
Payer: COMMERCIAL

## 2023-12-11 DIAGNOSIS — I10 ESSENTIAL HYPERTENSION: ICD-10-CM

## 2023-12-11 DIAGNOSIS — I25.10 CORONARY ARTERY DISEASE DUE TO LIPID RICH PLAQUE: ICD-10-CM

## 2023-12-11 DIAGNOSIS — I25.83 CORONARY ARTERY DISEASE DUE TO LIPID RICH PLAQUE: ICD-10-CM

## 2023-12-11 NOTE — TELEPHONE ENCOUNTER
Refill Routing Note   Medication(s) are not appropriate for processing by [facility] for the following reason(s):        Required vitals outdated  Required labs outdated    [facility] action(s):  Defer        Medication Therapy Plan: last prescribed by HARRY Grande-9/23; last seen by Taisha 7/22; please advise; defer      Appointments  past 12m or future 3m with PCP    Date Provider   Last Visit   Visit date not found Amee Grande MD   Next Visit   Visit date not found Amee Grande MD   ED visits in past 90 days: 0        Note composed:4:17 PM 12/11/2023

## 2023-12-12 RX ORDER — TELMISARTAN 40 MG/1
TABLET ORAL
Qty: 30 TABLET | Refills: 0 | Status: SHIPPED | OUTPATIENT
Start: 2023-12-12 | End: 2024-01-11

## 2023-12-12 NOTE — TELEPHONE ENCOUNTER
One-time refill request approved - patient will need to schedule clinic appointment prior to additional medication refills.     Amee Grande MD  [facility]  Office: [phone]   Fax: [phone]  12/12/2023

## 2024-01-08 DIAGNOSIS — I25.10 CORONARY ARTERY DISEASE DUE TO LIPID RICH PLAQUE: ICD-10-CM

## 2024-01-08 DIAGNOSIS — I25.83 CORONARY ARTERY DISEASE DUE TO LIPID RICH PLAQUE: ICD-10-CM

## 2024-01-08 DIAGNOSIS — I10 ESSENTIAL HYPERTENSION: ICD-10-CM

## 2024-01-09 RX ORDER — TELMISARTAN 40 MG/1
TABLET ORAL
Qty: 90 TABLET | Refills: 0 | OUTPATIENT
Start: 2024-01-09

## 2024-01-09 NOTE — TELEPHONE ENCOUNTER
Refill Routing Note   Medication(s) are not appropriate for processing by [facility] for the following reason(s):        Non-participating provider    [facility] action(s):  Route               Appointments  past 12m or future 3m with PCP    Date Provider   Last Visit   12/8/2022 Deonte Miramontes MD   Next Visit   Visit date not found Deonte Miramontes MD   ED visits in past 90 days: 0        Note composed:8:54 PM 01/08/2024